# Patient Record
Sex: FEMALE | Race: WHITE | Employment: UNEMPLOYED | URBAN - METROPOLITAN AREA
[De-identification: names, ages, dates, MRNs, and addresses within clinical notes are randomized per-mention and may not be internally consistent; named-entity substitution may affect disease eponyms.]

---

## 2017-03-21 ENCOUNTER — HOSPITAL ENCOUNTER (EMERGENCY)
Facility: CLINIC | Age: 22
Discharge: HOME | End: 2017-03-21
Attending: EMERGENCY MEDICINE | Admitting: EMERGENCY MEDICINE
Payer: COMMERCIAL

## 2017-03-21 VITALS
OXYGEN SATURATION: 98 % | TEMPERATURE: 98.1 F | SYSTOLIC BLOOD PRESSURE: 132 MMHG | DIASTOLIC BLOOD PRESSURE: 75 MMHG | RESPIRATION RATE: 16 BRPM

## 2017-03-21 DIAGNOSIS — N39.0 URINARY TRACT INFECTION WITHOUT HEMATURIA, SITE UNSPECIFIED: ICD-10-CM

## 2017-03-21 LAB
ALBUMIN UR-MCNC: 30 MG/DL
APPEARANCE UR: ABNORMAL
BACTERIA #/AREA URNS HPF: ABNORMAL /HPF
BILIRUB UR QL STRIP: NEGATIVE
COLOR UR AUTO: YELLOW
GLUCOSE UR STRIP-MCNC: NEGATIVE MG/DL
HCG UR QL: NEGATIVE
HGB UR QL STRIP: NEGATIVE
KETONES UR STRIP-MCNC: NEGATIVE MG/DL
LEUKOCYTE ESTERASE UR QL STRIP: ABNORMAL
MUCOUS THREADS #/AREA URNS LPF: PRESENT /LPF
NITRATE UR QL: NEGATIVE
PH UR STRIP: 7 PH (ref 5–7)
RBC #/AREA URNS AUTO: 0 /HPF (ref 0–2)
SP GR UR STRIP: 1.02 (ref 1–1.03)
SQUAMOUS #/AREA URNS AUTO: 40 /HPF (ref 0–1)
URN SPEC COLLECT METH UR: ABNORMAL
UROBILINOGEN UR STRIP-MCNC: NORMAL MG/DL (ref 0–2)
WBC #/AREA URNS AUTO: >182 /HPF (ref 0–2)

## 2017-03-21 PROCEDURE — 99283 EMERGENCY DEPT VISIT LOW MDM: CPT

## 2017-03-21 PROCEDURE — 81025 URINE PREGNANCY TEST: CPT | Performed by: EMERGENCY MEDICINE

## 2017-03-21 PROCEDURE — 87086 URINE CULTURE/COLONY COUNT: CPT | Performed by: EMERGENCY MEDICINE

## 2017-03-21 PROCEDURE — 81001 URINALYSIS AUTO W/SCOPE: CPT | Performed by: EMERGENCY MEDICINE

## 2017-03-21 RX ORDER — NITROFURANTOIN 25; 75 MG/1; MG/1
100 CAPSULE ORAL 2 TIMES DAILY
Qty: 14 CAPSULE | Refills: 0 | Status: SHIPPED | OUTPATIENT
Start: 2017-03-21

## 2017-03-21 ASSESSMENT — ENCOUNTER SYMPTOMS
HEMATURIA: 0
DYSURIA: 1
FREQUENCY: 1
FEVER: 0

## 2017-03-21 NOTE — ED AVS SNAPSHOT
Emergency Department    [address] [place] [address]    Phone:  [phone]    Fax:  [phone]                                       Isabela Gaines   MRN: [id number]    Department:   Emergency Department   Date of Visit:  3/21/2017           After Visit Summary Signature Page     I have received my discharge instructions, and my questions have been answered. I have discussed any challenges I see with this plan with the nurse or doctor.    ..........................................................................................................................................  Patient/Patient Representative Signature      ..........................................................................................................................................  Patient Representative Print Name and Relationship to Patient    ..................................................               ................................................  Date                                            Time    ..........................................................................................................................................  Reviewed by Signature/Title    ...................................................              ..............................................  Date                                                            Time

## 2017-03-21 NOTE — DISCHARGE INSTRUCTIONS
Urinary Tract Infections in Women  Urinary tract infections (UTIs) are most often caused by bacteria (germs). These bacteria enter the urinary tract. The bacteria may come from outside the body. Or they may travel from the skin outside the rectum or vagina into the urethra. Female anatomy makes it easier for bacteria from the bowel to enter a woman s urinary tract, which is the most common source of UTI. This means women develop UTIs more often than men. Pain in or around the urinary tract is a common UTI symptom. But the only way to know for sure if you have a UTI for the health care provider to test your urine. The two tests that may be done are the urinalysis and urine culture.  Types of UTIs    Cystitis: A bladder infection (cystitis) is the most common UTI in women. You may have urgent or frequent urination. You may also have pain, burning when you urinate, and bloody urine.    Urethritis: This is an inflamed urethra, which is the tube that carries urine from the bladder to outside the body. You may have lower stomach or back pain. You may also have urgent or frequent urination.    Pyelonephritis: This is a kidney infection. If not treated, it can be serious and damage your kidneys. In severe cases, you may be hospitalized. You may have a fever and lower back pain.  Medications to treat a UTI  Most UTIs are treated with antibiotics. These kill the bacteria. The length of time you need to take them depends on the type of infection. It may be as short as 3 days. If you have repeated UTIs, a low-dose antibiotic may be needed for several months. Take antibiotics exactly as directed. Don t stop taking them until all of the medication is gone. If you stop taking the antibiotic too soon, the infection may not go away, and you may develop a resistance to the antibiotic. This can make it much harder to treat.  Lifestyle changes to treat and prevent UTIs  The lifestyle changes below will help get rid of your UTI. They  may also help prevent future UTIs.    Drink plenty of fluids. This includes water, juice, or other caffeine-free drinks. Fluids help flush bacteria out of your body.    Empty your bladder. Always empty your bladder when you feel the urge to urinate. And always urinate before going to sleep. Urine that stays in your bladder can lead to infection. Try to urinate before and after sex as well.    Practice good personal hygiene. Wipe yourself from front to back after using the toilet. This helps keep bacteria from getting into the urethra.    Use condoms during sex. These help prevent UTIs caused by sexually transmitted bacteria. Also, avoid using spermicides during sex. These can increase the risk of UTIs. Choose other forms of birth control instead. For women who tend to get UTIs after sex, a low-dose of a preventive antibiotic may be used. Be sure to discuss this option with your health care provider.    Follow up with your health care provider as directed. He or she may test to make sure the infection has cleared. If necessary, additional treatment may be started.    9443-1232 [employer]. [address] [place] [address], [place] [address]. All rights reserved. This information is not intended as a substitute for professional medical care. Always follow your healthcare professional's instructions.

## 2017-03-21 NOTE — ED AVS SNAPSHOT
Emergency Department    [address] [facility] [place] [address]    Phone:  [phone]    Fax:  [phone]                                       Isabela Gaines   MRN: [id number]    Department:   Emergency Department   Date of Visit:  3/21/2017           Patient Information     Date Of Birth          1995        Your diagnoses for this visit were:     Urinary tract infection without hematuria, site unspecified        You were seen by Lakhwinder Moreno MD.      Follow-up Information     Follow up with Christine Vargas MD.    Specialty:  Family Practice    Contact information:    [facility]  [address] [place] [address]  [phone]          Discharge Instructions         Urinary Tract Infections in Women  Urinary tract infections (UTIs) are most often caused by bacteria (germs). These bacteria enter the urinary tract. The bacteria may come from outside the body. Or they may travel from the skin outside the rectum or vagina into the urethra. Female anatomy makes it easier for bacteria from the bowel to enter a woman s urinary tract, which is the most common source of UTI. This means women develop UTIs more often than men. Pain in or around the urinary tract is a common UTI symptom. But the only way to know for sure if you have a UTI for the health care provider to test your urine. The two tests that may be done are the urinalysis and urine culture.  Types of UTIs    Cystitis: A bladder infection (cystitis) is the most common UTI in women. You may have urgent or frequent urination. You may also have pain, burning when you urinate, and bloody urine.    Urethritis: This is an inflamed urethra, which is the tube that carries urine from the bladder to outside the body. You may have lower stomach or back pain. You may also have urgent or frequent urination.    Pyelonephritis: This is a kidney infection. If not treated, it can be serious and damage your kidneys. In  severe cases, you may be hospitalized. You may have a fever and lower back pain.  Medications to treat a UTI  Most UTIs are treated with antibiotics. These kill the bacteria. The length of time you need to take them depends on the type of infection. It may be as short as 3 days. If you have repeated UTIs, a low-dose antibiotic may be needed for several months. Take antibiotics exactly as directed. Don t stop taking them until all of the medication is gone. If you stop taking the antibiotic too soon, the infection may not go away, and you may develop a resistance to the antibiotic. This can make it much harder to treat.  Lifestyle changes to treat and prevent UTIs  The lifestyle changes below will help get rid of your UTI. They may also help prevent future UTIs.    Drink plenty of fluids. This includes water, juice, or other caffeine-free drinks. Fluids help flush bacteria out of your body.    Empty your bladder. Always empty your bladder when you feel the urge to urinate. And always urinate before going to sleep. Urine that stays in your bladder can lead to infection. Try to urinate before and after sex as well.    Practice good personal hygiene. Wipe yourself from front to back after using the toilet. This helps keep bacteria from getting into the urethra.    Use condoms during sex. These help prevent UTIs caused by sexually transmitted bacteria. Also, avoid using spermicides during sex. These can increase the risk of UTIs. Choose other forms of birth control instead. For women who tend to get UTIs after sex, a low-dose of a preventive antibiotic may be used. Be sure to discuss this option with your health care provider.    Follow up with your health care provider as directed. He or she may test to make sure the infection has cleared. If necessary, additional treatment may be started.    6891-8666 [employer]. [address] [place] [address], [place] [address]. All rights reserved. This information is not  intended as a substitute for professional medical care. Always follow your healthcare professional's instructions.          Future Appointments        Provider Department Dept Phone Center    3/28/2017 7:30 AM INDIGO [facility] [phone] [facility]      24 Hour Appointment Hotline       To make an appointment at any [facility], call [phone] ([phone]). If you don't have a family doctor or clinic, we will help you find one. [facility] (formerly [facility]) are conveniently located to serve the needs of you and your family.             Review of your medicines      START taking        Dose / Directions Last dose taken    nitrofurantoin (macrocrystal-monohydrate) 100 MG capsule   Commonly known as:  MACROBID   Dose:  100 mg   Quantity:  14 capsule        Take 1 capsule (100 mg) by mouth 2 times daily   Refills:  0                Prescriptions were sent or printed at these locations (1 Prescription)                   Other Prescriptions                Printed at Department/Unit printer (1 of 1)         nitrofurantoin, macrocrystal-monohydrate, (MACROBID) 100 MG capsule                Procedures and tests performed during your visit     HCG qualitative urine    UA reflex to Microscopic and Culture    Urine Culture Aerobic Bacterial      Orders Needing Specimen Collection     None      Pending Results     Date and Time Order Name Status Description    3/21/2017 1335 Urine Culture Aerobic Bacterial In process             Pending Culture Results     Date and Time Order Name Status Description    3/21/2017 1335 Urine Culture Aerobic Bacterial In process              Test Results from your hospital stay     3/21/2017  1:54 PM - Interface, [employer] Results      Component Results     Component Value Ref Range & Units Status    Color Urine Yellow  Final    Appearance Urine Slightly Cloudy  Final    Glucose Urine Negative NEG mg/dL Final    Bilirubin Urine Negative NEG Final    Ketones  Urine Negative NEG mg/dL Final    Specific Gravity Urine 1.021 1.003 - 1.035 Final    Blood Urine Negative NEG Final    pH Urine 7.0 5.0 - 7.0 pH Final    Protein Albumin Urine 30 (A) NEG mg/dL Final    Urobilinogen mg/dL Normal 0.0 - 2.0 mg/dL Final    Nitrite Urine Negative NEG Final    Leukocyte Esterase Urine Large (A) NEG Final    Source Midstream Urine  Final    RBC Urine 0 0 - 2 /HPF Final    WBC Urine >182 (H) 0 - 2 /HPF Final    Bacteria Urine Many (A) NEG /HPF Final    Squamous Epithelial /HPF Urine 40 (H) 0 - 1 /HPF Final    Mucous Urine Present (A) NEG /LPF Final         3/21/2017  1:49 PM - Interface, [employer] Results      Component Results     Component Value Ref Range & Units Status    HCG Qual Urine Negative NEG Final         3/21/2017  1:54 PM - Interface, [employer] Results                Clinical Quality Measure: Blood Pressure Screening     Your blood pressure was checked while you were in the emergency department today. The last reading we obtained was  BP: 132/75 . Please read the guidelines below about what these numbers mean and what you should do about them.  If your systolic blood pressure (the top number) is less than 120 and your diastolic blood pressure (the bottom number) is less than 80, then your blood pressure is normal. There is nothing more that you need to do about it.  If your systolic blood pressure (the top number) is 120-139 or your diastolic blood pressure (the bottom number) is 80-89, your blood pressure may be higher than it should be. You should have your blood pressure rechecked within a year by a primary care provider.  If your systolic blood pressure (the top number) is 140 or greater or your diastolic blood pressure (the bottom number) is 90 or greater, you may have high blood pressure. High blood pressure is treatable, but if left untreated over time it can put you at risk for heart attack, stroke, or kidney failure. You should have your blood pressure rechecked by a  "primary care provider within the next 4 weeks.  If your provider in the emergency department today gave you specific instructions to follow-up with your doctor or provider even sooner than that, you should follow that instruction and not wait for up to 4 weeks for your follow-up visit.        Thank you for choosing [facility]       Thank you for choosing [facility] for your care. Our goal is always to provide you with excellent care. Hearing back from our patients is one way we can continue to improve our services. Please take a few minutes to complete the written survey that you may receive in the mail after you visit with us. Thank you!        [employer] Information     [employer] lets you send messages to your doctor, view your test results, renew your prescriptions, schedule appointments and more. To sign up, go to [email or web address] . Click on \"Log in\" on the left side of the screen, which will take you to the Welcome page. Then click on \"Sign up Now\" on the right side of the page.     You will be asked to enter the access code listed below, as well as some personal information. Please follow the directions to create your username and password.     Your access code is: [email or web address]  Expires: 2017  1:43 PM     Your access code will  in 90 days. If you need help or a new code, please call your [facility] clinic or [phone].        Care EveryWhere ID     This is your Care EveryWhere ID. This could be used by other organizations to access your [facility] medical records  [id number]        After Visit Summary       This is your record. Keep this with you and show to your community pharmacist(s) and doctor(s) at your next visit.                  "

## 2017-03-21 NOTE — ED PROVIDER NOTES
History     Chief Complaint:  Urinary symptoms     HPI   Isabela Gaines is a 21 year old female who presents for evaluation of urinary symptoms. The patient reports 1.5 weeks of frequency and dysuria. She also notes face flushing yesterday and today. She has been taking OTC medications which only helps transiently. She denies any hematuria. Patient's last LMP was 3/1. She denies any vaginal discharge or any other symptoms.     Allergies:  Vancomycin     Medications:    The patient is currently on no regular medications.     Past Medical History:    Drug induced delirium   Chronic mental illness     Past Surgical History:    Tooth extraction     Family History:    Father - diabetes, heart disease   Grandfather - diabetes     Social History:  Marital Status:  Single   Smoking status: Never smoker  Alcohol use: No   Presents to the ED with significant other     Review of Systems   Constitutional: Negative for fever.   Genitourinary: Positive for dysuria and frequency. Negative for hematuria and vaginal discharge.   All other systems reviewed and are negative.      Physical Exam     Patient Vitals for the past 24 hrs:   BP Temp Temp src Heart Rate Resp SpO2   03/21/17 1247 132/75 98.1  F (36.7  C) Oral 84 16 98 %      Physical Exam  Constitutional: The patient is oriented to person, place, and time.   HENT:   Head: Atraumatic  Right Ear: Normal  Left Ear: Normal  Nose: Nose normal.   Mouth/Throat: Oropharynx is clear and moist. No erythema or exudate.   Eyes: Conjunctivae and EOM are normal. Pupils are equal, round, and reactive to light. No discharge  Neck: Normal range of motion. Neck supple.   Cardiovascular: Normal rate, regular rhythm, no murmur gallops or rubs. Intact distal pulses.    Pulmonary/Chest: CTA bilaterally. No wheezes rale or rhonchi.  Abdominal: Soft. Mild suprapubic tenderness.  No masses   Musculoskeletal: No edema. No bony deformity. Normal range of motion  Lymphadenopathy:     The patient has  no cervical adenopathy.   Neurological: The patient is alert and oriented to person, place, and time. The patient has normal strength and normal reflexes. No cranial nerve deficit. Coordination normal.   Skin: Skin is warm and dry. No rash noted. The patient is not diaphoretic.   Psychiatric: The patient has a normal mood and affect.    Emergency Department Course     Laboratory:  UA: Slightly cloudy yellow urine, protein albumin 30 (A), large leukocyte esterase, WBC >182 (H), many  bacteria, 40 squamous cells, mucous present, otherwise WNL   Urine culture: pending   HCG: negative    Emergency Department Course:  Nursing notes and vitals reviewed.  (1304) I performed an exam of the patient as documented above.    Urine sample was obtained and sent for laboratory analysis, findings above.     (1400) Findings and plan explained to the patient. Patient discharged home with instructions regarding supportive care, medications, and reasons to return. The importance of close follow-up was reviewed. The patient was prescribed Macrobid.      Impression & Plan      Medical Decision Making:  Isabela Gaines is a 21 year old female who presents for evaluation of urinary symptoms.  This clinically is consistent with a urinary tract infection.  Urinalysis confirms the infection.  There has been no fever, back/flank pain or significant abdominal pain.  There is no clinical evidence of pyelonephritis, appendicitis, colitis, diverticulitis or any intraabdominal catastrophe. The patient will be started on antibiotics for the infection. Return if increasing pain, vomiting, fever, or inability to tolerate the oral antibiotic.  Follow up with primary physician is indicated if not improving in 2-3 days.        Diagnosis:    ICD-10-CM   1. Urinary tract infection without hematuria, site unspecified N39.0       Disposition:  Patient is discharged to home.     Discharge Medications:  New Prescriptions    NITROFURANTOIN,  MACROCRYSTAL-MONOHYDRATE, (MACROBID) 100 MG CAPSULE    Take 1 capsule (100 mg) by mouth 2 times daily         Contreras Caldera  3/21/2017    EMERGENCY DEPARTMENT    I, Contreras Caldera, am serving as a scribe on 3/21/2017 at 1:04 PM to personally document services performed by Dr. Moreno based on my observations and the provider's statements to me.        Lakhwinder Moreno MD  03/21/17 5640

## 2017-03-22 LAB
BACTERIA SPEC CULT: NORMAL
MICRO REPORT STATUS: NORMAL
SPECIMEN SOURCE: NORMAL

## 2020-06-16 ENCOUNTER — APPOINTMENT (OUTPATIENT)
Dept: RADIOLOGY | Facility: MEDICAL CENTER | Age: 25
End: 2020-06-16
Attending: EMERGENCY MEDICINE
Payer: MEDICAID

## 2020-06-16 ENCOUNTER — HOSPITAL ENCOUNTER (EMERGENCY)
Facility: MEDICAL CENTER | Age: 25
End: 2020-06-17
Attending: EMERGENCY MEDICINE
Payer: MEDICAID

## 2020-06-16 VITALS
RESPIRATION RATE: 18 BRPM | HEART RATE: 89 BPM | DIASTOLIC BLOOD PRESSURE: 58 MMHG | BODY MASS INDEX: 22.98 KG/M2 | SYSTOLIC BLOOD PRESSURE: 121 MMHG | HEIGHT: 59 IN | TEMPERATURE: 97.9 F | OXYGEN SATURATION: 98 % | WEIGHT: 114 LBS

## 2020-06-16 DIAGNOSIS — O20.0 THREATENED MISCARRIAGE IN EARLY PREGNANCY: ICD-10-CM

## 2020-06-16 LAB
ALBUMIN SERPL BCP-MCNC: 4.6 G/DL (ref 3.2–4.9)
ALBUMIN/GLOB SERPL: 1.5 G/DL
ALP SERPL-CCNC: 48 U/L (ref 30–99)
ALT SERPL-CCNC: 7 U/L (ref 2–50)
ANION GAP SERPL CALC-SCNC: 19 MMOL/L (ref 7–16)
AST SERPL-CCNC: 13 U/L (ref 12–45)
B-HCG SERPL-ACNC: ABNORMAL MIU/ML (ref 0–5)
BASOPHILS # BLD AUTO: 0.3 % (ref 0–1.8)
BASOPHILS # BLD: 0.02 K/UL (ref 0–0.12)
BILIRUB SERPL-MCNC: 0.4 MG/DL (ref 0.1–1.5)
BUN SERPL-MCNC: 6 MG/DL (ref 8–22)
CALCIUM SERPL-MCNC: 9.3 MG/DL (ref 8.5–10.5)
CHLORIDE SERPL-SCNC: 99 MMOL/L (ref 96–112)
CO2 SERPL-SCNC: 17 MMOL/L (ref 20–33)
CREAT SERPL-MCNC: 0.56 MG/DL (ref 0.5–1.4)
EOSINOPHIL # BLD AUTO: 0.04 K/UL (ref 0–0.51)
EOSINOPHIL NFR BLD: 0.5 % (ref 0–6.9)
ERYTHROCYTE [DISTWIDTH] IN BLOOD BY AUTOMATED COUNT: 38.8 FL (ref 35.9–50)
GLOBULIN SER CALC-MCNC: 3.1 G/DL (ref 1.9–3.5)
GLUCOSE SERPL-MCNC: 97 MG/DL (ref 65–99)
HCT VFR BLD AUTO: 43.1 % (ref 37–47)
HGB BLD-MCNC: 15.3 G/DL (ref 12–16)
IMM GRANULOCYTES # BLD AUTO: 0.03 K/UL (ref 0–0.11)
IMM GRANULOCYTES NFR BLD AUTO: 0.4 % (ref 0–0.9)
LYMPHOCYTES # BLD AUTO: 1.23 K/UL (ref 1–4.8)
LYMPHOCYTES NFR BLD: 16.3 % (ref 22–41)
MCH RBC QN AUTO: 29.7 PG (ref 27–33)
MCHC RBC AUTO-ENTMCNC: 35.5 G/DL (ref 33.6–35)
MCV RBC AUTO: 83.7 FL (ref 81.4–97.8)
MONOCYTES # BLD AUTO: 0.75 K/UL (ref 0–0.85)
MONOCYTES NFR BLD AUTO: 9.9 % (ref 0–13.4)
NEUTROPHILS # BLD AUTO: 5.47 K/UL (ref 2–7.15)
NEUTROPHILS NFR BLD: 72.6 % (ref 44–72)
NRBC # BLD AUTO: 0 K/UL
NRBC BLD-RTO: 0 /100 WBC
NUMBER OF RH DOSES IND 8505RD: 1
PLATELET # BLD AUTO: 409 K/UL (ref 164–446)
PMV BLD AUTO: 9.9 FL (ref 9–12.9)
POTASSIUM SERPL-SCNC: 3.2 MMOL/L (ref 3.6–5.5)
PROT SERPL-MCNC: 7.7 G/DL (ref 6–8.2)
RBC # BLD AUTO: 5.15 M/UL (ref 4.2–5.4)
RH BLD: NORMAL
SODIUM SERPL-SCNC: 135 MMOL/L (ref 135–145)
WBC # BLD AUTO: 7.5 K/UL (ref 4.8–10.8)

## 2020-06-16 PROCEDURE — 81001 URINALYSIS AUTO W/SCOPE: CPT

## 2020-06-16 PROCEDURE — 84702 CHORIONIC GONADOTROPIN TEST: CPT

## 2020-06-16 PROCEDURE — 99284 EMERGENCY DEPT VISIT MOD MDM: CPT

## 2020-06-16 PROCEDURE — 86901 BLOOD TYPING SEROLOGIC RH(D): CPT

## 2020-06-16 PROCEDURE — 80053 COMPREHEN METABOLIC PANEL: CPT

## 2020-06-16 PROCEDURE — 76801 OB US < 14 WKS SINGLE FETUS: CPT

## 2020-06-16 PROCEDURE — 85025 COMPLETE CBC W/AUTO DIFF WBC: CPT

## 2020-06-16 PROCEDURE — 700105 HCHG RX REV CODE 258: Performed by: EMERGENCY MEDICINE

## 2020-06-16 RX ORDER — SODIUM CHLORIDE 9 MG/ML
1000 INJECTION, SOLUTION INTRAVENOUS ONCE
Status: COMPLETED | OUTPATIENT
Start: 2020-06-16 | End: 2020-06-16

## 2020-06-16 RX ADMIN — SODIUM CHLORIDE 1000 ML: 9 INJECTION, SOLUTION INTRAVENOUS at 21:55

## 2020-06-17 LAB
APPEARANCE UR: CLEAR
BACTERIA #/AREA URNS HPF: ABNORMAL /HPF
BILIRUB UR QL STRIP.AUTO: NEGATIVE
COLOR UR: ABNORMAL
EPI CELLS #/AREA URNS HPF: ABNORMAL /HPF
GLUCOSE UR STRIP.AUTO-MCNC: NEGATIVE MG/DL
HYALINE CASTS #/AREA URNS LPF: ABNORMAL /LPF
KETONES UR STRIP.AUTO-MCNC: >=80 MG/DL
LEUKOCYTE ESTERASE UR QL STRIP.AUTO: ABNORMAL
MICRO URNS: ABNORMAL
NITRITE UR QL STRIP.AUTO: POSITIVE
PH UR STRIP.AUTO: 6 [PH] (ref 5–8)
PROT UR QL STRIP: NEGATIVE MG/DL
RBC # URNS HPF: ABNORMAL /HPF
RBC UR QL AUTO: ABNORMAL
SP GR UR STRIP.AUTO: 1.01
UROBILINOGEN UR STRIP.AUTO-MCNC: 1 MG/DL
WBC #/AREA URNS HPF: ABNORMAL /HPF

## 2020-06-17 NOTE — ED NOTES
Discharge instructions reviewed and signed with patient. PIV removed by RN. No questions at this time. Patient ambulated out of ED with a steady gait.

## 2020-06-17 NOTE — ED TRIAGE NOTES
"Chief Complaint   Patient presents with   • Vaginal Bleeding   • Low Back Pain   • Pregnancy     6-7 weeks pregnant      Pt wheeled to triage for above complaint. Pt went to [facility] on Sunday for abd cramping and was told everything was fine and received Rhogam shot. Pt now reports she went to the bathroom and felt \"gushing blood\". Pt came straight here after the event occurred. Pt has abdominal cramping, low back pain, nausea and feels \"very dizzy\". Pt has pads in place but is unsure if she is still bleeding at the moment.   Charge RN notified. Pt to G34  /93   Pulse (!) 109   Temp 36.6 °C (97.9 °F) (Temporal)   Resp 18   Ht 1.499 m (4' 11\")   Wt 51.7 kg (114 lb)   SpO2 97%   BMI 23.03 kg/m²     "

## 2020-06-17 NOTE — ED PROVIDER NOTES
ED Provider Note    CHIEF COMPLAINT  Chief Complaint   Patient presents with   • Vaginal Bleeding   • Low Back Pain   • Pregnancy     6-7 weeks pregnant        HPI  Kim Brice is a 25 y.o. female who presents with vaginal bleeding.  The patient states she is about 6-1/2 weeks pregnant.  She states over the last several week she has had a lot of cramping abdominal pain.  She was recently evaluated at [facility] had an ultrasound that she states was normal.  The patient today started having increased cramping followed by some vomiting and diarrhea.  That she started having heavy vaginal bleeding and does have some associated dizziness.  She denies difficulties with urination.  She has not had any recent fevers.  She is unaware of any sick contacts.    REVIEW OF SYSTEMS  See HPI for further details. All other systems are negative.     PAST MEDICAL HISTORY  No past medical history on file.    FAMILY HISTORY  [unfilled]    SOCIAL HISTORY  Social History     Socioeconomic History   • Marital status: Not on file     Spouse name: Not on file   • Number of children: Not on file   • Years of education: Not on file   • Highest education level: Not on file   Occupational History   • Not on file   Social Needs   • Financial resource strain: Not on file   • Food insecurity     Worry: Not on file     Inability: Not on file   • Transportation needs     Medical: Not on file     Non-medical: Not on file   Tobacco Use   • Smoking status: Former Smoker   • Smokeless tobacco: Never Used   Substance and Sexual Activity   • Alcohol use: Not Currently   • Drug use: Not Currently   • Sexual activity: Not on file   Lifestyle   • Physical activity     Days per week: Not on file     Minutes per session: Not on file   • Stress: Not on file   Relationships   • Social connections     Talks on phone: Not on file     Gets together: Not on file     Attends [employer] service: Not on file     Active member of club or  "organization: Not on file     Attends meetings of clubs or organizations: Not on file     Relationship status: Not on file   • Intimate partner violence     Fear of current or ex partner: Not on file     Emotionally abused: Not on file     Physically abused: Not on file     Forced sexual activity: Not on file   Other Topics Concern   • Not on file   Social History Narrative   • Not on file       SURGICAL HISTORY  No past surgical history on file.    CURRENT MEDICATIONS  Home Medications    **Home medications have not yet been reviewed for this encounter**         ALLERGIES  Allergies   Allergen Reactions   • Vancomycin        PHYSICAL EXAM  VITAL SIGNS: /93   Pulse (!) 109   Temp 36.6 °C (97.9 °F) (Temporal)   Resp 18   Ht 1.499 m (4' 11\")   Wt 51.7 kg (114 lb)   SpO2 97%   BMI 23.03 kg/m²       Constitutional: Mild acute distress, Non-toxic appearance.   HENT: Normocephalic, Atraumatic, Bilateral external ears normal, Oropharynx moist, No oral exudates, Nose normal.   Eyes: PERRLA, EOMI, Conjunctiva normal, No discharge.   Neck: Normal range of motion, No tenderness, Supple, No stridor.   Lymphatic: No lymphadenopathy noted.   Cardiovascular: Tachycardic heart rate, Normal rhythm, No murmurs, No rubs, No gallops.   Thorax & Lungs: Normal breath sounds, No respiratory distress, No wheezing, No chest tenderness.   Abdomen: Bowel sounds normal, Soft, suprapubic tenderness, No masses, No pulsatile masses.   Skin: Warm, Dry, No erythema, No rash.   Back: No tenderness, No CVA tenderness.   Genitalia: Cervix is closed, no uterine or adnexal tenderness, mild amount of blood in the vault  Extremities: Intact distal pulses, No edema, No tenderness, No cyanosis, No clubbing.   Neurologic: Alert & oriented x 3, Normal motor function, Normal sensory function, No focal deficits noted.   Psychiatric: Affect normal, Judgment normal, Mood normal.     RADIOLOGY/PROCEDURES  US-OB 1ST TRIMESTER WITH TRANSVAGINAL (COMBO) "   Final Result         1.  Viable single intrauterine gestation of an estimated gestational age 6 weeks 3 days for estimated date of delivery February 2, 2021.   2.  Small subchorionic hemorrhage.   3.  Probable right ovarian corpus luteal cyst, otherwise indeterminate.            COURSE & MEDICAL DECISION MAKING  Pertinent Labs & Imaging studies reviewed. (See chart for details)  This a 25-year-old female who presents the emergency department with vaginal bleeding.  Ultrasound confirms intrauterine implantation with a subchorionic hemorrhage.  I suspect this is the source.  The patient is currently hemodynamically stable.  Her cervix is closed which is comforting.  The patient will be discharged with instructions to drink lots of fluids and maintain pelvic rest until cleared by her gynecologist.  She is not anemic.  She is Rh- and received RhoGam prior to discharge.    FINAL IMPRESSION  1.  Threatened miscarriage    Disposition  The patient will be discharged in stable condition         Electronically signed by: Alfredo Carlson M.D., 6/16/2020 9:48 PM

## 2020-12-31 ENCOUNTER — HOSPITAL ENCOUNTER (INPATIENT)
Dept: HOSPITAL 8 - 3E | Age: 25
LOS: 4 days | Discharge: HOME | DRG: 751 | End: 2021-01-04
Attending: PSYCHIATRY & NEUROLOGY | Admitting: PSYCHIATRY & NEUROLOGY
Payer: MEDICAID

## 2020-12-31 VITALS — HEIGHT: 59 IN | WEIGHT: 116.18 LBS | BODY MASS INDEX: 23.42 KG/M2

## 2020-12-31 DIAGNOSIS — G47.00: ICD-10-CM

## 2020-12-31 DIAGNOSIS — F12.10: ICD-10-CM

## 2020-12-31 DIAGNOSIS — Z79.899: ICD-10-CM

## 2020-12-31 DIAGNOSIS — F10.20: ICD-10-CM

## 2020-12-31 DIAGNOSIS — F33.2: Primary | ICD-10-CM

## 2020-12-31 PROCEDURE — 84443 ASSAY THYROID STIM HORMONE: CPT

## 2020-12-31 PROCEDURE — 82607 VITAMIN B-12: CPT

## 2020-12-31 PROCEDURE — 84439 ASSAY OF FREE THYROXINE: CPT

## 2020-12-31 PROCEDURE — 80053 COMPREHEN METABOLIC PANEL: CPT

## 2020-12-31 PROCEDURE — 93005 ELECTROCARDIOGRAM TRACING: CPT

## 2020-12-31 PROCEDURE — 85025 COMPLETE CBC W/AUTO DIFF WBC: CPT

## 2020-12-31 PROCEDURE — 36415 COLL VENOUS BLD VENIPUNCTURE: CPT

## 2020-12-31 PROCEDURE — 71045 X-RAY EXAM CHEST 1 VIEW: CPT

## 2020-12-31 PROCEDURE — 81001 URINALYSIS AUTO W/SCOPE: CPT

## 2020-12-31 PROCEDURE — 87086 URINE CULTURE/COLONY COUNT: CPT

## 2020-12-31 PROCEDURE — 81025 URINE PREGNANCY TEST: CPT

## 2020-12-31 PROCEDURE — 80061 LIPID PANEL: CPT

## 2020-12-31 RX ADMIN — QUETIAPINE FUMARATE SCH MG: 25 TABLET ORAL at 22:47

## 2021-01-01 VITALS — DIASTOLIC BLOOD PRESSURE: 62 MMHG | SYSTOLIC BLOOD PRESSURE: 95 MMHG

## 2021-01-01 VITALS — DIASTOLIC BLOOD PRESSURE: 75 MMHG | SYSTOLIC BLOOD PRESSURE: 109 MMHG

## 2021-01-01 VITALS — SYSTOLIC BLOOD PRESSURE: 109 MMHG | DIASTOLIC BLOOD PRESSURE: 73 MMHG

## 2021-01-01 LAB
ALBUMIN SERPL-MCNC: 3.9 G/DL (ref 3.4–5)
ALP SERPL-CCNC: 70 U/L (ref 45–117)
ALT SERPL-CCNC: 13 U/L (ref 12–78)
ANION GAP SERPL CALC-SCNC: 6 MMOL/L (ref 5–15)
BASOPHILS # BLD AUTO: 0 X10^3/UL (ref 0–0.1)
BASOPHILS NFR BLD AUTO: 1 % (ref 0–1)
BILIRUB SERPL-MCNC: 0.6 MG/DL (ref 0.2–1)
CALCIUM SERPL-MCNC: 8.9 MG/DL (ref 8.5–10.1)
CHLORIDE SERPL-SCNC: 112 MMOL/L (ref 98–107)
CHOL/HDL RATIO: 2.9
CREAT SERPL-MCNC: 0.88 MG/DL (ref 0.55–1.02)
EOSINOPHIL # BLD AUTO: 0.3 X10^3/UL (ref 0–0.4)
EOSINOPHIL NFR BLD AUTO: 6 % (ref 1–7)
ERYTHROCYTE [DISTWIDTH] IN BLOOD BY AUTOMATED COUNT: 14.2 % (ref 9.6–15.2)
HCG UR SG: 1.03 (ref 1–1.03)
HDL CHOL %: 35 % (ref 28–40)
HDL CHOLESTEROL (DIRECT): 57 MG/DL (ref 40–60)
LDL CHOLESTEROL,CALCULATED: 94 MG/DL (ref 54–169)
LDLC/HDLC SERPL: 1.6 {RATIO} (ref 0.5–3)
LYMPHOCYTES # BLD AUTO: 1.5 X10^3/UL (ref 1–3.4)
LYMPHOCYTES NFR BLD AUTO: 37 % (ref 22–44)
MCH RBC QN AUTO: 28.9 PG (ref 27–34.8)
MCHC RBC AUTO-ENTMCNC: 34.2 G/DL (ref 32.4–35.8)
MD: NO
MICROSCOPIC: (no result)
MONOCYTES # BLD AUTO: 0.4 X10^3/UL (ref 0.2–0.8)
MONOCYTES NFR BLD AUTO: 9 % (ref 2–9)
NEUTROPHILS # BLD AUTO: 2 X10^3/UL (ref 1.8–6.8)
NEUTROPHILS NFR BLD AUTO: 48 % (ref 42–75)
PLATELET # BLD AUTO: 381 X10^3/UL (ref 130–400)
PMV BLD AUTO: 8.7 FL (ref 7.4–10.4)
PROT SERPL-MCNC: 7.7 G/DL (ref 6.4–8.2)
RBC # BLD AUTO: 5.2 X10^6/UL (ref 3.82–5.3)
T4 FREE SERPL-MCNC: 1.01 NG/DL (ref 0.76–1.46)
TRIGL SERPL-MCNC: 64 MG/DL (ref 50–200)
VLDLC SERPL CALC-MCNC: 13 MG/DL (ref 0–25)

## 2021-01-01 RX ADMIN — QUETIAPINE FUMARATE SCH MG: 25 TABLET ORAL at 20:16

## 2021-01-01 RX ADMIN — SERTRALINE HYDROCHLORIDE SCH MG: 50 TABLET ORAL at 20:17

## 2021-01-02 VITALS — DIASTOLIC BLOOD PRESSURE: 77 MMHG | SYSTOLIC BLOOD PRESSURE: 113 MMHG

## 2021-01-02 VITALS — DIASTOLIC BLOOD PRESSURE: 67 MMHG | SYSTOLIC BLOOD PRESSURE: 100 MMHG

## 2021-01-02 RX ADMIN — QUETIAPINE FUMARATE SCH MG: 25 TABLET ORAL at 20:05

## 2021-01-02 RX ADMIN — SERTRALINE HYDROCHLORIDE SCH MG: 50 TABLET ORAL at 20:05

## 2021-01-03 VITALS — DIASTOLIC BLOOD PRESSURE: 70 MMHG | SYSTOLIC BLOOD PRESSURE: 104 MMHG

## 2021-01-03 VITALS — DIASTOLIC BLOOD PRESSURE: 70 MMHG | SYSTOLIC BLOOD PRESSURE: 103 MMHG

## 2021-01-03 RX ADMIN — QUETIAPINE FUMARATE SCH MG: 25 TABLET ORAL at 20:12

## 2021-01-04 VITALS — DIASTOLIC BLOOD PRESSURE: 68 MMHG | SYSTOLIC BLOOD PRESSURE: 98 MMHG

## 2021-03-29 ENCOUNTER — OFFICE VISIT (OUTPATIENT)
Dept: URGENT CARE | Facility: PHYSICIAN GROUP | Age: 26
End: 2021-03-29
Payer: MEDICAID

## 2021-03-29 VITALS
OXYGEN SATURATION: 97 % | WEIGHT: 125 LBS | HEIGHT: 59 IN | BODY MASS INDEX: 25.2 KG/M2 | TEMPERATURE: 98 F | SYSTOLIC BLOOD PRESSURE: 108 MMHG | HEART RATE: 76 BPM | DIASTOLIC BLOOD PRESSURE: 82 MMHG | RESPIRATION RATE: 18 BRPM

## 2021-03-29 DIAGNOSIS — J30.2 SEASONAL ALLERGIES: ICD-10-CM

## 2021-03-29 PROCEDURE — 99203 OFFICE O/P NEW LOW 30 MIN: CPT | Performed by: PHYSICIAN ASSISTANT

## 2021-03-29 RX ORDER — QUETIAPINE FUMARATE 50 MG/1
TABLET, FILM COATED ORAL
COMMUNITY
Start: 2021-02-06 | End: 2021-10-08

## 2021-03-29 RX ORDER — QUETIAPINE FUMARATE 25 MG/1
50 TABLET, FILM COATED ORAL
COMMUNITY
Start: 2021-01-04 | End: 2021-10-08

## 2021-03-29 RX ORDER — DEXAMETHASONE 4 MG/1
TABLET ORAL
COMMUNITY
Start: 2021-01-30 | End: 2021-03-29

## 2021-03-29 RX ORDER — FAMOTIDINE 20 MG/1
TABLET, FILM COATED ORAL
COMMUNITY
Start: 2021-01-30 | End: 2021-10-08

## 2021-03-29 ASSESSMENT — FIBROSIS 4 INDEX: FIB4 SCORE: 0.32

## 2021-03-29 NOTE — PROGRESS NOTES
"Chief Complaint   Patient presents with   • Cough     Persistent cough, and mucus production. Pt states OTC allergy meds provide some relief. Pt states Sx started about 1 month ago.         HISTORY OF PRESENT ILLNESS: Patient is a 25 y.o. female who presents today for the following:    ST x 1 month  + nasal congestion, watery eyes, cough  H/o allergies, both seasonal and environmental/medication  Worse when windows are open  Claritin helps  Had a reaction to something    There are no problems to display for this patient.      Allergies:Vancomycin    Current Outpatient Medications Ordered in Epic   Medication Sig Dispense Refill   • famotidine (PEPCID) 20 MG Tab TAKE 1 TABLET BY MOUTH TWICE DAILY FOR 7 DAYS     • QUEtiapine (SEROQUEL) 50 MG tablet TAKE 1 TABLET BY MOUTH EVERY NIGHT AT BEDTIME FOR MOOD STABALIZATION     • QUEtiapine (SEROQUEL) 25 MG Tab Take 50 mg by mouth.       No current Epic-ordered facility-administered medications on file.       History reviewed. No pertinent past medical history.    Social History     Tobacco Use   • Smoking status: Former Smoker   • Smokeless tobacco: Never Used   Substance Use Topics   • Alcohol use: Not Currently   • Drug use: Not Currently       No family status information on file.   History reviewed. No pertinent family history.    Review of Systems:   Constitutional ROS: No unexpected change in weight  Pulmonary ROS: No chronic cough, sputum, or hemoptysis, No dyspnea on exertion, No wheezing  Cardiovascular ROS: No diaphoresis, No edema, No palpitations  Hematologic/Lymphatic ROS: No chills, No night sweats, No weight loss  Skin/Integumentary ROS: No edema, No evidence of rash, No itching    Exam:  /82   Pulse 76   Temp 36.7 °C (98 °F) (Temporal)   Resp 18   Ht 1.499 m (4' 11\")   Wt 56.7 kg (125 lb)   SpO2 97%   General: Well developed, well nourished. No distress.    Eye: PERRL/EOMI; conjunctivae clear, lids normal.  ENMT: Lips without lesions, MMM. " Oropharynx is clear. Bilateral TMs are within normal limits but bulging bilaterally.  Nasal mucosa is markedly edematous bilaterally.  Pulmonary: Unlabored respiratory effort. Lungs clear to auscultation, no wheezes, no rhonchi.    Cardiovascular: Regular rate and rhythm without murmur.   Neurologic: Grossly nonfocal. No facial asymmetry noted.  Lymph: No cervical lymphadenopathy noted.  Skin: Warm, dry, good turgor. No rashes in visible areas.   Psych: Normal mood. Alert and oriented to person, place and time.    Assessment/Plan:  Discussed likely seasonal allergies.  Discussed appropriate over-the-counter symptomatic medication, and when to return to clinic. Follow up for worsening or persistent symptoms.  1. Seasonal allergies  REFERRAL TO ALLERGY

## 2021-10-08 ENCOUNTER — APPOINTMENT (OUTPATIENT)
Dept: RADIOLOGY | Facility: IMAGING CENTER | Age: 26
End: 2021-10-08
Attending: FAMILY MEDICINE
Payer: MEDICAID

## 2021-10-08 ENCOUNTER — HOSPITAL ENCOUNTER (OUTPATIENT)
Facility: MEDICAL CENTER | Age: 26
End: 2021-10-08
Attending: FAMILY MEDICINE
Payer: MEDICAID

## 2021-10-08 ENCOUNTER — HOSPITAL ENCOUNTER (OUTPATIENT)
Dept: LAB | Facility: MEDICAL CENTER | Age: 26
End: 2021-10-08
Attending: FAMILY MEDICINE
Payer: MEDICAID

## 2021-10-08 ENCOUNTER — HOSPITAL ENCOUNTER (EMERGENCY)
Facility: MEDICAL CENTER | Age: 26
End: 2021-10-08
Payer: MEDICAID

## 2021-10-08 ENCOUNTER — OFFICE VISIT (OUTPATIENT)
Dept: URGENT CARE | Facility: PHYSICIAN GROUP | Age: 26
End: 2021-10-08
Payer: MEDICAID

## 2021-10-08 VITALS
BODY MASS INDEX: 23.79 KG/M2 | HEIGHT: 59 IN | RESPIRATION RATE: 18 BRPM | SYSTOLIC BLOOD PRESSURE: 116 MMHG | HEART RATE: 88 BPM | WEIGHT: 118 LBS | DIASTOLIC BLOOD PRESSURE: 70 MMHG | TEMPERATURE: 98.1 F | OXYGEN SATURATION: 96 %

## 2021-10-08 VITALS
BODY MASS INDEX: 25.25 KG/M2 | HEIGHT: 59 IN | OXYGEN SATURATION: 93 % | TEMPERATURE: 97.8 F | DIASTOLIC BLOOD PRESSURE: 73 MMHG | SYSTOLIC BLOOD PRESSURE: 114 MMHG | RESPIRATION RATE: 16 BRPM | HEART RATE: 86 BPM

## 2021-10-08 DIAGNOSIS — R06.02 SOB (SHORTNESS OF BREATH): ICD-10-CM

## 2021-10-08 DIAGNOSIS — M94.0 COSTOCHONDRITIS: ICD-10-CM

## 2021-10-08 LAB
D DIMER PPP IA.FEU-MCNC: <0.27 UG/ML (FEU) (ref 0–0.5)
INT CON NEG: NORMAL
INT CON POS: NORMAL
POC URINE PREGNANCY TEST: NEGATIVE

## 2021-10-08 PROCEDURE — U0005 INFEC AGEN DETEC AMPLI PROBE: HCPCS

## 2021-10-08 PROCEDURE — 85379 FIBRIN DEGRADATION QUANT: CPT

## 2021-10-08 PROCEDURE — 36415 COLL VENOUS BLD VENIPUNCTURE: CPT

## 2021-10-08 PROCEDURE — 71046 X-RAY EXAM CHEST 2 VIEWS: CPT | Mod: TC,FY | Performed by: FAMILY MEDICINE

## 2021-10-08 PROCEDURE — 81025 URINE PREGNANCY TEST: CPT | Performed by: FAMILY MEDICINE

## 2021-10-08 PROCEDURE — U0003 INFECTIOUS AGENT DETECTION BY NUCLEIC ACID (DNA OR RNA); SEVERE ACUTE RESPIRATORY SYNDROME CORONAVIRUS 2 (SARS-COV-2) (CORONAVIRUS DISEASE [COVID-19]), AMPLIFIED PROBE TECHNIQUE, MAKING USE OF HIGH THROUGHPUT TECHNOLOGIES AS DESCRIBED BY CMS-2020-01-R: HCPCS

## 2021-10-08 PROCEDURE — 302449 STATCHG TRIAGE ONLY (STATISTIC)

## 2021-10-08 PROCEDURE — 99215 OFFICE O/P EST HI 40 MIN: CPT | Mod: CS | Performed by: FAMILY MEDICINE

## 2021-10-08 ASSESSMENT — FIBROSIS 4 INDEX: FIB4 SCORE: 0.34

## 2021-10-08 NOTE — PROGRESS NOTES
"Chief Complaint   Patient presents with   • Coronavirus Screening     SOB     Subjective:                 CC:  cough        Cough  This is a new problem. The current episode started 4  days ago. The problem has been unchanged. The problem occurs constantly. The cough is dry. Associated symptoms include : fatigue, sob, bilat ribcage pain with deep inspiration, subj fever, anosmia. Pertinent negatives include no   headaches, nausea, vomiting, diarrhea, sweats, weight loss or wheezing. Nothing aggravates the symptoms.  Patient has tried nothing for the symptoms. There is no history of asthma.   + exposure to COVID       No past medical history on file.      Social History     Tobacco Use   • Smoking status: Former Smoker   • Smokeless tobacco: Never Used   Vaping Use   • Vaping Use: Never used   Substance Use Topics   • Alcohol use: Not Currently   • Drug use: Not Currently         Current Outpatient Medications on File Prior to Visit   Medication Sig Dispense Refill   • NS SOLN 60 mL with albuterol 2.5 mg/0.5 mL NEBU 10 mL Take 10 mg/hr by nebulization.     • Montelukast Sodium (SINGULAIR PO) Take  by mouth.       No current facility-administered medications on file prior to visit.                    Review of Systems      HENT: negative for otalgia  Cardiovascular - + ribcage pain and dyspnea  Respiratory: Positive for cough.  .  Negative for wheezing.    Neurological: Negative for headaches.   GI - denies nausea, vomiting or diarrhea  Neuro - denies numbness or tingling.            Objective:     /70   Pulse 88   Temp 36.7 °C (98.1 °F)   Resp 18   Ht 1.499 m (4' 11\")   Wt 53.5 kg (118 lb)   SpO2 96%     Physical Exam   Constitutional: patient is oriented to person, place, and time. Patient appears well-developed and well-nourished. No distress.   HENT:   Head: Normocephalic and atraumatic.   Right Ear: External ear normal.   Left Ear: External ear normal.   Nose: Mucosal edema  present. Right sinus " exhibits no maxillary sinus tenderness. Left sinus exhibits no maxillary sinus tenderness.   Mouth/Throat: Mucous membranes are normal. No oral lesions.  No posterior pharyngeal erythema.  No oropharyngeal exudate or posterior oropharyngeal edema.   Eyes: Conjunctivae and EOM are normal. Pupils are equal, round, and reactive to light. Right eye exhibits no discharge. Left eye exhibits no discharge. No scleral icterus.   Neck: Normal range of motion. Neck supple. No tracheal deviation present.   Cardiovascular: Normal rate, regular rhythm and normal heart sounds.  Exam reveals no friction rub.    Pulmonary/Chest: Effort normal. No respiratory distress. Patient has no wheezes or rhonchi. Patient has no rales.    Musculoskeletal:  exhibits no edema.   Lymphadenopathy:     Patient has no cervical adenopathy.      Neurological: patient is alert and oriented to person, place, and time.   Skin: Skin is warm and dry. No rash noted. No erythema.   Psychiatric: patient  has a normal mood and affect.  behavior is normal.   Nursing note and vitals reviewed.         Details    Reading Physician Reading Date Result Priority   Kira Brice M.D.  [phone] 10/8/2021 Urgent [facility]   Narrative & Impression     10/8/2021 2:11 PM     HISTORY/REASON FOR EXAM:  Cough        TECHNIQUE/EXAM DESCRIPTION AND NUMBER OF VIEWS:  PA and lateral views of the chest.     COMPARISON: None     FINDINGS:     Heart: The cardiac silhouette is normal in size.     Mediastinum: Normal contours.     Lungs: The lungs are clear.     Pleura: No pleural fluid.     Bones: No suspicious bony lesions.     Lines/tubes: None.           IMPRESSION:     No evidence of acute cardiopulmonary process.        Exam Ended: 10/08/21  2:23 PM Last Resulted: 10/08/21  2:29 PM             Assessment/Plan:       1. SOB (shortness of breath)   Chest x-ray was personally interpreted and reviewed. No acute cardiopulmonary findings. No pulmonary infiltrates or densities.  Cardiac silhouette is normal. No hemidiaphragm elevation. No bony abnormalities.    Sx likely represent COVID      bilat rib pain likely secondary to cough.    rx voltaren gel, prn      D-dimer ordered to r/o PE        COVID screen sent  Home isolation for now  Will call with results.     Follow up in one week if no improvement, sooner if symptoms worsen.         My total time spent caring for the patient on the day of the encounter was 40 minutes.   This does not include time spent on separately billable procedures/tests.

## 2021-10-09 DIAGNOSIS — R06.02 SOB (SHORTNESS OF BREATH): ICD-10-CM

## 2021-10-09 LAB
COVID ORDER STATUS COVID19: NORMAL
SARS-COV-2 RNA RESP QL NAA+PROBE: DETECTED
SPECIMEN SOURCE: ABNORMAL

## 2021-11-06 ENCOUNTER — APPOINTMENT (OUTPATIENT)
Dept: RADIOLOGY | Facility: IMAGING CENTER | Age: 26
End: 2021-11-06
Attending: FAMILY MEDICINE
Payer: MEDICAID

## 2021-11-06 ENCOUNTER — OFFICE VISIT (OUTPATIENT)
Dept: URGENT CARE | Facility: PHYSICIAN GROUP | Age: 26
End: 2021-11-06
Payer: MEDICAID

## 2021-11-06 VITALS
TEMPERATURE: 98 F | WEIGHT: 121 LBS | SYSTOLIC BLOOD PRESSURE: 110 MMHG | HEART RATE: 74 BPM | RESPIRATION RATE: 16 BRPM | DIASTOLIC BLOOD PRESSURE: 76 MMHG | BODY MASS INDEX: 24.39 KG/M2 | OXYGEN SATURATION: 100 % | HEIGHT: 59 IN

## 2021-11-06 DIAGNOSIS — M54.50 ACUTE LOW BACK PAIN WITHOUT SCIATICA, UNSPECIFIED BACK PAIN LATERALITY: ICD-10-CM

## 2021-11-06 DIAGNOSIS — S33.5XXA LUMBAR SPRAIN, INITIAL ENCOUNTER: ICD-10-CM

## 2021-11-06 PROCEDURE — 99214 OFFICE O/P EST MOD 30 MIN: CPT | Performed by: FAMILY MEDICINE

## 2021-11-06 PROCEDURE — 72100 X-RAY EXAM L-S SPINE 2/3 VWS: CPT | Mod: TC,FY | Performed by: FAMILY MEDICINE

## 2021-11-06 PROCEDURE — 72220 X-RAY EXAM SACRUM TAILBONE: CPT | Mod: TC,FY | Performed by: FAMILY MEDICINE

## 2021-11-06 RX ORDER — HYDROCODONE BITARTRATE AND ACETAMINOPHEN 5; 325 MG/1; MG/1
TABLET ORAL
Qty: 15 TABLET | Refills: 0 | Status: CANCELLED | OUTPATIENT
Start: 2021-11-06

## 2021-11-06 RX ORDER — PREDNISONE 20 MG/1
TABLET ORAL
Qty: 9 TABLET | Refills: 0 | Status: SHIPPED | OUTPATIENT
Start: 2021-11-06 | End: 2021-11-06

## 2021-11-06 RX ORDER — CYCLOBENZAPRINE HCL 10 MG
TABLET ORAL
Qty: 21 TABLET | Refills: 0 | Status: SHIPPED | OUTPATIENT
Start: 2021-11-06 | End: 2022-02-20

## 2021-11-06 ASSESSMENT — PAIN SCALES - GENERAL: PAINLEVEL: 3=SLIGHT PAIN

## 2021-11-06 ASSESSMENT — FIBROSIS 4 INDEX: FIB4 SCORE: 0.34

## 2021-11-06 NOTE — PROGRESS NOTES
Chief Complaint:    Chief Complaint   Patient presents with   • Back Pain     lifted bed on sunday and felt pop in her back, bent over last night and felt pop again and now has severe back pain        History of Present Illness:    She picked up a bed possibly 6 days ago or less, felt pop and pain in lower back/coccyx area. It seemed to be getting better with rest, time, OTC NSAID, and marijuana. However, bent over last night and felt the pop and pain again.      Past Medical History:    No past medical history on file.    Past Surgical History:    No past surgical history on file.    Social History:    Social History     Socioeconomic History   • Marital status: Single     Spouse name: Not on file   • Number of children: Not on file   • Years of education: Not on file   • Highest education level: Not on file   Occupational History   • Not on file   Tobacco Use   • Smoking status: Former Smoker   • Smokeless tobacco: Never Used   Vaping Use   • Vaping Use: Never used   Substance and Sexual Activity   • Alcohol use: Not Currently   • Drug use: Yes     Types: Marijuana   • Sexual activity: Not on file   Other Topics Concern   • Not on file   Social History Narrative   • Not on file     Social Determinants of Health     Financial Resource Strain:    • Difficulty of Paying Living Expenses:    Food Insecurity:    • Worried About Running Out of Food in the Last Year:    • Ran Out of Food in the Last Year:    Transportation Needs:    • Lack of Transportation (Medical):    • Lack of Transportation (Non-Medical):    Physical Activity:    • Days of Exercise per Week:    • Minutes of Exercise per Session:    Stress:    • Feeling of Stress :    Social Connections:    • Frequency of Communication with Friends and Family:    • Frequency of Social Gatherings with Friends and Family:    • Attends L[employer] Services:    • Active Member of Clubs or Organizations:    • Attends Club or Organization Meetings:    • Marital Status:   "  Intimate Partner Violence:    • Fear of Current or Ex-Partner:    • Emotionally Abused:    • Physically Abused:    • Sexually Abused:      Family History:    No family history on file.    Medications:    Current Outpatient Medications on File Prior to Visit   Medication Sig Dispense Refill   • Naproxen Sodium (ALEVE PO) Take  by mouth.       No current facility-administered medications on file prior to visit.     Allergies:    Allergies   Allergen Reactions   • Vancomycin        Vitals:    Vitals:    11/06/21 1511   BP: 110/76   Pulse: 74   Resp: 16   Temp: 36.7 °C (98 °F)   SpO2: 100%   Weight: 54.9 kg (121 lb)   Height: 1.499 m (4' 11\")       Physical Exam:    Constitutional: Vital signs reviewed. Appears well-developed and well-nourished. No acute distress.   Eyes: Sclera white, conjunctivae clear.  ENT: External ears normal. Hearing normal.  Pulmonary/Chest: Respirations non-labored.  Musculoskeletal: Tender in midline lower L-spine and coccyx area with palpation and lumbar flexion and extension. Moderately decreased lumbar flexion and extension due to pain. Normal gait. No muscular atrophy or weakness.  Neurological: Alert and oriented to person, place, and time. Muscle tone normal. Coordination normal. Light touch and sensation normal.   Skin: No rashes or lesions. Warm, dry, normal turgor.  Psychiatric: Normal mood and affect. Behavior is normal. Judgment and thought content normal.       Diagnostics:    DX-LUMBAR SPINE-2 OR 3 VIEWS  Order: [id number]  Status:  Final result   Visible to patient:  No (scheduled for 11/7/2021  1:48 PM) Next appt:  None Dx:  Acute low back pain without sciatica,...   0 Result Notes  Details    Reading Physician Reading Date Result Priority   Stephania Allen M.D.  [phone] 11/6/2021 Urgent Care   Narrative & Impression     11/6/2021 3:20 PM     HISTORY/REASON FOR EXAM: Pain. Injury.     TECHNIQUE/ EXAM DESCRIPTION AND NUMBER OF VIEWS:  3 views of the lumbar " spine.     COMPARISON: None.     FINDINGS:  There are 5 nonrib-bearing lumbar vertebra. No compression fracture is identified. Intervertebral disc spaces are maintained. Alignment of the facet joints is maintained. There is a moderate amount of colonic stool. SI joints are symmetric. Femoral heads   are seated within the acetabula.     IMPRESSION:     1.  No fracture or subluxation is seen.  2.  Moderate amount of colonic stool.        DX-SACRUM AND COCCYX 2+  Order: [id number]  Status:  Final result   Visible to patient:  No (scheduled for 2021  1:49 PM) Next appt:  None Dx:  Acute low back pain without sciatica,...   0 Result Notes  Details    Reading Physician Reading Date Result Priority   Stephania Allen M.D.  [phone] 2021 Urgent Care   Narrative & Impression     2021 3:20 PM     HISTORY/REASON FOR EXAM: Injury. Pain.        TECHNIQUE/EXAM DESCRIPTION AND NUMBER OF VIEWS:  3 views of the sacrum and coccyx.     COMPARISON: None.     FINDINGS:  No fracture or dislocation is seen. SI joints are symmetric. There is a moderate amount of colonic stool. Femoral heads are seated within the acetabula. There is no diastases of the symphysis pubis.     IMPRESSION:     1.  No fracture or dislocation is seen.  2.  Moderate amount of colonic stool.        I personally reviewed the images. Rad reports reviewed with her and copies of reports to her.      Medical Decision Makin. Acute low back pain without sciatica, unspecified back pain laterality  - DX-LUMBAR SPINE-2 OR 3 VIEWS; Future  - DX-SACRUM AND COCCYX 2+; Future  - predniSONE (DELTASONE) 20 MG Tab; 2 TABS BY MOUTH ONCE A DAY ON DAYS 1-3, 1 TAB ONCE A DAY ON DAYS 4-6. TAKE WITH FOOD.  Dispense: 9 Tablet; Refill: 0  - cyclobenzaprine (FLEXERIL) 10 mg Tab; 1 TAB BY MOUTH EVERY 8 HOURS ONLY IF NEEDED FOR PAIN, MUSCLE SPASM, AND/OR MUSCLE TIGHTNESS. MAY CAUSE DROWSINESS.  Dispense: 21 Tablet; Refill: 0    2. Lumbar sprain, initial encounter  -  predniSONE (DELTASONE) 20 MG Tab; 2 TABS BY MOUTH ONCE A DAY ON DAYS 1-3, 1 TAB ONCE A DAY ON DAYS 4-6. TAKE WITH FOOD.  Dispense: 9 Tablet; Refill: 0  - cyclobenzaprine (FLEXERIL) 10 mg Tab; 1 TAB BY MOUTH EVERY 8 HOURS ONLY IF NEEDED FOR PAIN, MUSCLE SPASM, AND/OR MUSCLE TIGHTNESS. MAY CAUSE DROWSINESS.  Dispense: 21 Tablet; Refill: 0      Discussed with her DDX, management options, and risks, benefits, and alternatives to treatment plan agreed upon.    She picked up a bed possibly 6 days ago or less, felt pop and pain in lower back/coccyx area. It seemed to be getting better with rest, time, OTC NSAID, and marijuana. However, bent over last night and felt the pop and pain again.    Tender in midline lower L-spine and coccyx area with palpation and lumbar flexion and extension. Moderately decreased lumbar flexion and extension due to pain.    X-rays of L-spine and Coccyx today: No fracture or dislocation is seen. Moderate amount of colonic stool.    Likely MSK inflammation and/or muscle tightness/spasm as cause of symptoms.     Rec'd relative rest.    She declines Toradol IM.    Agreeable to medications prescribed for potent anti-inflammatory treatment and muscle relaxer as needed.    Discussed expected course of duration, time for improvement, and to seek follow-up in Emergency Room, urgent care, or with PCP if getting worse at any time or not improving within expected time frame.

## 2022-02-20 ENCOUNTER — OFFICE VISIT (OUTPATIENT)
Dept: URGENT CARE | Facility: PHYSICIAN GROUP | Age: 27
End: 2022-02-20
Payer: MEDICAID

## 2022-02-20 ENCOUNTER — HOSPITAL ENCOUNTER (OUTPATIENT)
Facility: MEDICAL CENTER | Age: 27
End: 2022-02-20
Attending: FAMILY MEDICINE
Payer: MEDICAID

## 2022-02-20 VITALS
TEMPERATURE: 98 F | BODY MASS INDEX: 18.64 KG/M2 | WEIGHT: 101.3 LBS | DIASTOLIC BLOOD PRESSURE: 62 MMHG | SYSTOLIC BLOOD PRESSURE: 100 MMHG | OXYGEN SATURATION: 100 % | HEIGHT: 62 IN | RESPIRATION RATE: 14 BRPM | HEART RATE: 124 BPM

## 2022-02-20 DIAGNOSIS — N10 ACUTE PYELONEPHRITIS: ICD-10-CM

## 2022-02-20 DIAGNOSIS — R30.0 DYSURIA: ICD-10-CM

## 2022-02-20 LAB
APPEARANCE UR: CLEAR
BILIRUB UR STRIP-MCNC: NORMAL MG/DL
COLOR UR AUTO: NORMAL
GLUCOSE UR STRIP.AUTO-MCNC: 100 MG/DL
INT CON NEG: NORMAL
INT CON POS: NORMAL
KETONES UR STRIP.AUTO-MCNC: 160 MG/DL
LEUKOCYTE ESTERASE UR QL STRIP.AUTO: NORMAL
NITRITE UR QL STRIP.AUTO: NORMAL
PH UR STRIP.AUTO: 5 [PH] (ref 5–8)
POC URINE PREGNANCY TEST: NORMAL
PROT UR QL STRIP: 100 MG/DL
RBC UR QL AUTO: NORMAL
SP GR UR STRIP.AUTO: 1.01
UROBILINOGEN UR STRIP-MCNC: 2 MG/DL

## 2022-02-20 PROCEDURE — 81025 URINE PREGNANCY TEST: CPT | Performed by: FAMILY MEDICINE

## 2022-02-20 PROCEDURE — 99214 OFFICE O/P EST MOD 30 MIN: CPT | Mod: 25 | Performed by: FAMILY MEDICINE

## 2022-02-20 PROCEDURE — 81002 URINALYSIS NONAUTO W/O SCOPE: CPT | Performed by: FAMILY MEDICINE

## 2022-02-20 PROCEDURE — 87077 CULTURE AEROBIC IDENTIFY: CPT

## 2022-02-20 PROCEDURE — 87086 URINE CULTURE/COLONY COUNT: CPT

## 2022-02-20 PROCEDURE — 87186 SC STD MICRODIL/AGAR DIL: CPT

## 2022-02-20 RX ORDER — CIPROFLOXACIN 500 MG/1
TABLET, FILM COATED ORAL
Qty: 20 TABLET | Refills: 0 | Status: CANCELLED | OUTPATIENT
Start: 2022-02-20 | End: 2022-03-02

## 2022-02-20 RX ORDER — IBUPROFEN 800 MG/1
TABLET ORAL
Qty: 30 TABLET | Refills: 0 | Status: SHIPPED | OUTPATIENT
Start: 2022-02-20 | End: 2023-07-06

## 2022-02-20 RX ORDER — SULFAMETHOXAZOLE AND TRIMETHOPRIM 800; 160 MG/1; MG/1
1 TABLET ORAL EVERY 12 HOURS
Qty: 28 TABLET | Refills: 0 | Status: SHIPPED | OUTPATIENT
Start: 2022-02-20 | End: 2022-03-06

## 2022-02-20 ASSESSMENT — FIBROSIS 4 INDEX: FIB4 SCORE: 0.34

## 2022-02-20 NOTE — PROGRESS NOTES
Chief Complaint:    Chief Complaint   Patient presents with   • Follow-Up   • Painful Urination     Pt states she just had a kidney infection and is experiencing the same symptoms as before but on the opposite side       History of Present Illness:    She was treated for kidney infection by other medical facility with Cefdinir x 10 days, shows me Rx on her phone, this was prescribed on 1/29/22. She improved while on this medication. 2 days ago, she started with symptoms with fever, chills, dysuria, urinary frequency, urinary urgency, and discomfort in left kidney region. She took Azo for symptoms. She reports history of multiple UTIs in the past and she has taken Bactrim in the past for UTI which has worked and been tolerated and sometimes has needed to take Bactirm x 2 weeks.      Past Medical History:    History reviewed. No pertinent past medical history.    Past Surgical History:    History reviewed. No pertinent surgical history.    Social History:    Social History     Socioeconomic History   • Marital status: Single     Spouse name: Not on file   • Number of children: Not on file   • Years of education: Not on file   • Highest education level: Not on file   Occupational History   • Not on file   Tobacco Use   • Smoking status: Former Smoker   • Smokeless tobacco: Never Used   Vaping Use   • Vaping Use: Never used   Substance and Sexual Activity   • Alcohol use: Not Currently   • Drug use: Yes     Types: Marijuana   • Sexual activity: Not on file   Other Topics Concern   • Not on file   Social History Narrative   • Not on file     Social Determinants of Health     Financial Resource Strain: Not on file   Food Insecurity: Not on file   Transportation Needs: Not on file   Physical Activity: Not on file   Stress: Not on file   Social Connections: Not on file   Intimate Partner Violence: Not on file   Housing Stability: Not on file     Family History:    History reviewed. No pertinent family  "history.    Medications:    No current outpatient medications on file prior to visit.     No current facility-administered medications on file prior to visit.     Allergies:    Allergies   Allergen Reactions   • Vancomycin        Vitals:    Vitals:    02/20/22 1410   BP: 100/62   Pulse: (!) 124   Resp: 14   Temp: 36.7 °C (98 °F)   SpO2: 100%   Weight: 45.9 kg (101 lb 4.8 oz)   Height: 1.575 m (5' 2\")       Physical Exam:    Constitutional: Vital signs reviewed. Appears well-developed and well-nourished. No acute distress.   Eyes: Sclera white, conjunctivae clear.   ENT: External ears normal. Hearing normal.  Neck: Neck supple.   Pulmonary/Chest: Respirations non-labored.   Musculoskeletal: Positive left CVA TTP. No right CVA TTP. Normal gait. No muscular atrophy or weakness.  Neurological: Alert and oriented to person, place, and time. Muscle tone normal. Coordination normal.    Skin: No rashes or lesions. Warm, dry, normal turgor.  Psychiatric: Normal mood and affect. Behavior is normal. Judgment and thought content normal.       Diagnostics:    POCT Urinalysis  Order: [id number]   Status: Final result     Visible to patient: No (scheduled for 2/21/2022 12:42 PM)     Next appt: None     Dx: Dysuria     0 Result Notes     Ref Range & Units  2:41 PM   POC Color Negative red    POC Appearance Negative clear    POC Leukocyte Esterase Negative large    POC Nitrites Negative pos    POC Urobiligen Negative (0.2) mg/dL 2.0    POC Protein Negative mg/dL 100    POC Urine PH 5.0 - 8.0 5.0    POC Blood Negative small    POC Specific Gravity <1.005 - >1.030 1.015    POC Ketones Negative mg/dL 160    POC Bilirubin Negative mg/dL neg    POC Glucose Negative mg/dL 100    Resulting Agency  [facility]              Specimen Collected: 02/20/22  2:41 PM Last Resulted: 02/20/22  2:42 PM           POCT PREGNANCY  Order: [id number]   Status: Final result     Visible to patient: Tyesha (scheduled for 2/21/2022 12:28 PM)     Next appt: None "     Dx: Dysuria     0 Result Notes    Component Ref Range & Units  2:28 PM 4 mo ago   POC Urine Pregnancy Test Negative neg  negative    Internal Control Positive  Valid  Valid    Internal Control Negative  Valid  Valid    Resulting Agency  [facility]              Specimen Collected: 22  2:28 PM Last Resulted: 22  2:28 PM             Medical Decision Makin. Dysuria  - POCT PREGNANCY  - POCT Urinalysis    2. Acute pyelonephritis  - sulfamethoxazole-trimethoprim (BACTRIM DS) 800-160 MG tablet; Take 1 Tablet by mouth every 12 hours for 14 days.  Dispense: 28 Tablet; Refill: 0  - ibuprofen (MOTRIN) 800 MG Tab; 1 TAB BY MOUTH EVERY 8 HOURS ONLY IF NEEDED FOR PAIN. TAKE WITH FOOD.  Dispense: 30 Tablet; Refill: 0  - URINE CULTURE(NEW); Future      Discussed with her DDX, management options, and risks, benefits, and alternatives to treatment plan agreed upon.    She was treated for kidney infection by other medical facility with Cefdinir x 10 days, shows me Rx on her phone, this was prescribed on 22. She improved while on this medication. 2 days ago, she started with symptoms with fever, chills, dysuria, urinary frequency, urinary urgency, and discomfort in left kidney region. She took Azo for symptoms. She reports history of multiple UTIs in the past and she has taken Bactrim in the past for UTI which has worked and been tolerated and sometimes has needed to take Bactirm x 2 weeks.    Positive left CVA TTP.    Urine HCG: negative.    Urine dipstick: multiple abnormalities, but she took Azo which makes result less reliable.    Clinically consistent with Pyelonephritis and is agreeable to treat as such.    She prefers different antibiotic than recent Cefdinir. She declines Rocephin IM. She requests Ibuprofen Rx for pain.    Agreeable to medications prescribed and send urine for culture.    Discussed expected course of duration, time for improvement, and to seek follow-up in Emergency  Room, urgent care, or with PCP if getting worse at any time or not improving within expected time frame.

## 2022-02-21 DIAGNOSIS — N10 ACUTE PYELONEPHRITIS: ICD-10-CM

## 2022-02-24 LAB
BACTERIA UR CULT: ABNORMAL
BACTERIA UR CULT: ABNORMAL
SIGNIFICANT IND 70042: ABNORMAL
SITE SITE: ABNORMAL
SOURCE SOURCE: ABNORMAL

## 2023-01-08 ENCOUNTER — APPOINTMENT (OUTPATIENT)
Dept: URGENT CARE | Facility: PHYSICIAN GROUP | Age: 28
End: 2023-01-08

## 2023-07-06 ENCOUNTER — OFFICE VISIT (OUTPATIENT)
Dept: URGENT CARE | Facility: PHYSICIAN GROUP | Age: 28
End: 2023-07-06
Payer: MEDICAID

## 2023-07-06 VITALS
RESPIRATION RATE: 14 BRPM | DIASTOLIC BLOOD PRESSURE: 92 MMHG | TEMPERATURE: 99.1 F | BODY MASS INDEX: 18.29 KG/M2 | WEIGHT: 100 LBS | SYSTOLIC BLOOD PRESSURE: 124 MMHG | HEART RATE: 113 BPM | OXYGEN SATURATION: 98 %

## 2023-07-06 DIAGNOSIS — R55 SYNCOPE, UNSPECIFIED SYNCOPE TYPE: ICD-10-CM

## 2023-07-06 PROCEDURE — 99213 OFFICE O/P EST LOW 20 MIN: CPT | Performed by: FAMILY MEDICINE

## 2023-07-06 PROCEDURE — 3080F DIAST BP >= 90 MM HG: CPT | Performed by: FAMILY MEDICINE

## 2023-07-06 PROCEDURE — 3074F SYST BP LT 130 MM HG: CPT | Performed by: FAMILY MEDICINE

## 2023-07-06 RX ORDER — PREDNISONE 20 MG/1
TABLET ORAL
COMMUNITY
Start: 2023-04-29 | End: 2023-07-06

## 2023-07-06 RX ORDER — AMOXICILLIN AND CLAVULANATE POTASSIUM 875; 125 MG/1; MG/1
1 TABLET, FILM COATED ORAL 2 TIMES DAILY
COMMUNITY
Start: 2023-04-27 | End: 2023-07-06

## 2023-07-06 RX ORDER — ALBUTEROL SULFATE 90 UG/1
2 AEROSOL, METERED RESPIRATORY (INHALATION) EVERY 6 HOURS
COMMUNITY
Start: 2023-04-27 | End: 2023-07-06

## 2023-07-06 RX ORDER — PREDNISONE 50 MG/1
TABLET ORAL
COMMUNITY
Start: 2023-04-27 | End: 2023-07-06

## 2023-07-06 ASSESSMENT — ENCOUNTER SYMPTOMS: FEVER: 0

## 2023-07-06 NOTE — PROGRESS NOTES
Subjective:     Kim Brice is a 28 y.o. female who presents for Referral Needed (Neurologist )    HPI  Pt presents for evaluation of an acute problem  Pt had possible seizure last week   Seen in [facility]   Pt does not remember the episode   Took to ER from EMS   Thought to have had a seizure, and referred to neurology   Pt called neurologist and does not take insurance   Requests new referral   Has not had any new episodes since hospital visit     Review of Systems   Constitutional:  Negative for fever.     PMH:  has no past medical history on file.  MEDS: No current outpatient medications on file.  ALLERGIES:   Allergies   Allergen Reactions    Vancomycin      SURGHX: No past surgical history on file.  SOCHX:  reports that she has quit smoking. She has never used smokeless tobacco. She reports that she does not currently use alcohol. She reports current drug use. Drug: Marijuana.     Objective:   BP (!) 124/92   Pulse (!) 113   Temp 37.3 °C (99.1 °F) (Temporal)   Resp 14   Wt 45.4 kg (100 lb)   SpO2 98%   BMI 18.29 kg/m²     Physical Exam  Constitutional:       General: She is not in acute distress.     Appearance: She is well-developed. She is not diaphoretic.   Pulmonary:      Effort: Pulmonary effort is normal.   Neurological:      Mental Status: She is alert.       Assessment/Plan:   Assessment    1. Syncope, unspecified syncope type  - Referral to Neurology    Patient with syncopal event which was evaluated through [facility] and discharged with outpatient follow-up through neurology.  Unfortunately, neurology office that she was referred to discuss her current insurance.  Patient having difficulties getting into her neurologist.  She requests new referral.  The referral has been made, given strict precautions if having future episodes, and reviewed other things to avoid.  She is not cleared to drive and should avoid swimming, biking, and other high-speed  or dangerous activities.  Follow-up with neurology.

## 2023-07-27 ENCOUNTER — OFFICE VISIT (OUTPATIENT)
Dept: NEUROLOGY | Facility: MEDICAL CENTER | Age: 28
End: 2023-07-27
Attending: PSYCHIATRY & NEUROLOGY
Payer: MEDICAID

## 2023-07-27 VITALS
HEIGHT: 59 IN | SYSTOLIC BLOOD PRESSURE: 100 MMHG | HEART RATE: 77 BPM | WEIGHT: 98.99 LBS | BODY MASS INDEX: 19.96 KG/M2 | OXYGEN SATURATION: 99 % | TEMPERATURE: 98.6 F | DIASTOLIC BLOOD PRESSURE: 60 MMHG

## 2023-07-27 DIAGNOSIS — F39 MOOD DISORDER (HCC): ICD-10-CM

## 2023-07-27 DIAGNOSIS — R40.20 LOSS OF CONSCIOUSNESS (HCC): ICD-10-CM

## 2023-07-27 PROCEDURE — 99211 OFF/OP EST MAY X REQ PHY/QHP: CPT | Performed by: PSYCHIATRY & NEUROLOGY

## 2023-07-27 PROCEDURE — 3078F DIAST BP <80 MM HG: CPT | Performed by: PSYCHIATRY & NEUROLOGY

## 2023-07-27 PROCEDURE — 3074F SYST BP LT 130 MM HG: CPT | Performed by: PSYCHIATRY & NEUROLOGY

## 2023-07-27 PROCEDURE — 99204 OFFICE O/P NEW MOD 45 MIN: CPT | Performed by: PSYCHIATRY & NEUROLOGY

## 2023-07-27 ASSESSMENT — PATIENT HEALTH QUESTIONNAIRE - PHQ9: CLINICAL INTERPRETATION OF PHQ2 SCORE: 0

## 2023-07-27 NOTE — PROGRESS NOTES
NEUROLOGY NEW PATIENT ENCOUNTER - 07/27/2023       Chief Complaint: Loss of consciousness      HISTORY OF PRESENTING COMPLAINT:   is a 28-year-old right-handed lady coming to neurology clinic for recent episode of loss of consciousness.  This was her first visit with us.  I was unable to review the emergency room notes associated with this episode and all history is obtained from talking to the patient .  She was accompanied by her boyfriend who she lives with for today's clinic visit.    Patient mentioned she had her first seizure in the setting of drug overdose in 2016.  She attempted suicide with excessive Benadryl and was told there was twitching movements before she was taken to the hospital and she was in a medically induced coma with concern for seizures at that time.  Patient was discharged on carbamazepine and levetiracetam, but both of them were discontinued due to concern for a rash within a month.  Since that time she was not put on any antiepileptic medication and did not have any additional episodes of concern till June 2023.    She reported multiple episodes in June where she would wake up in the morning abruptly from her sleep and had a sensation of her brain feeling awake but a body unable to respond.  She also gets a feeling of urgency and has to walk around, this would happen almost on a daily basis for almost 6 days always in the morning.  Then on July 1, 2023 she had the similar sensation in the morning and she went to lay back in the room.  Her boyfriend heard a loud scream from the room and found her to be confused with bilateral upper extremities flexed and tight and lower extremities extended with shaking lasting for 4-5 minutes.  She was taken to [facility] and had head CT and blood work, which per the patient was all within normal limits and she was discharged home.  She has not had any other episodes of full body shaking or loss of consciousness.    There  were no episodes for almost 2 to 3 weeks but then her episodes started happening again with the last episode in July 23, 2023.  She does not lose full consciousness with these episodes, and is aware of them but does not know how long it will last for there have not been witnesses to these episodes.  The boyfriend has only witnessed whole body shaking episode on July 1 .    Patient is struggled with mood issues for many years but not being followed through a psychiatrist or therapist at least in the last year.  She mentioned not being on medications for mood control for more than 3 to 4 years.  She continues to smoke marijuana multiple times a day to help with her anxiety and fibromyalgia.  She denied any recent suicidal ideation but is aware of aggravated anxiety with her new onset episodes.      She cannot think of any clear trigger factors for her new onset episodes since June 2023.  She denied any focal weakness in upper or lower extremities, no difficulty with speech or swallowing or falls.  She does not drive and does not drink alcohol and denied any other drug use .    Epilepsy risk factors; patient was born full-term and denied any developmental delay or any febrile seizures prolonged hospital stay or meningitis no episodes of concern as a child.  She is adopted and is not aware of any family history of epilepsy    Previous Investigations:  None available to review    CURRENT MEDICATIONS AT THE TIME OF THIS ENCOUNTER:  No current outpatient medications on file.     PAST MEDICAL HISTORY:  Depression/anxiety  H/o Suicide attempt  Fibromyalgia   Seizure 07/2023    PAST SURGICAL HISTORY:  No past surgical history on file.     FAMILY HISTORY:  No children  Adopted when born, has biological siblings health history unclear    SOCIAL HISTORY:    Lives with boyfriend and 2 roommates  Alcohol: not drinking anymore for many years  Smoke marijuana: 5-6 times a day  No drugs use  Vape nicotine, last worked 10 years  "ago    Social History     Tobacco Use    Smoking status: Former    Smokeless tobacco: Never   Vaping Use    Vaping Use: Never used   Substance Use Topics    Alcohol use: Not Currently    Drug use: Yes     Types: Marijuana          Review of systems:      All other systems were reviewed and negative other than the ones mentioned in HPI.    EXAM:   Ambulatory Vitals:  /60 (BP Location: Right arm, Patient Position: Sitting, BP Cuff Size: Adult)   Pulse 77   Temp 37 °C (98.6 °F) (Temporal)   Ht 1.499 m (4' 11\")   Wt 44.9 kg (98 lb 15.8 oz)   SpO2 99%        Physical Exam:   Neurological Exam:  Higher Mental Function:   Awake, alert and oriented to place, person and time  Able to answer questions appropriately and follow commands well  Memory intact to immediate recall and remote events  Speech is clear and language fluent   Mood: appeared anxious    Cranial Nerves:  CN II: Pupils equal and reactive, no visual field deficits on confrontation  CN III,IV, VI : EOM intact with no nystagmus  CN V: Facial sensation intact  CN VII: No facial asymmetry  CN VIII: Intact hearing to conversation  CN IX, X: palate elevates symmetrically   CN XI: Symmetric shoulder shrug  CN XII: tongue midline. No signs of tongue biting or fasciculations    Motor: 5/5  strength in bilateral upper and lower extremities, No tremor at rest or on outstretched hands. Tone normal and no fasciculations  Sensory: Intact to light touch, temperature, and vibration in all 4 extremities  Reflexes: 2+ and symmetric in all 4 extremities  Coordination: Intact to finger to nose in both upper extremities, no truncal or appendicular ataxia  Gait: Normal gait, without the use of any assistance. No difficulty getting up from the chair     General:   Patient in no acute distress, pleasant and cooperative.  HEENT: Normocephalic, no signs of acute trauma.   Neck: Supple. There is normal range of motion.     ASSESSMENT AND PLAN:  Loss of " "consciousness:   is a 28-year-old right-handed lady coming to neurology clinic for her new onset episodes in June 2023.  She described weird feeling of \"waking up abruptly\" in the morning and not feeling well, lasting for a few hours.  She does not lose consciousness for these episodes, but does not feel right . She had these episodes on a daily basis for up to 6 days in AM on waking up and on the 7-day this was followed by an episode of loss of consciousness with whole body shaking.  The boyfriend reported her hearing a scream from her room and her body upper extremities flexed and tight and lower extremities extended with loss of consciousness suggestive of her whole body convulsion.  She was seen in the emergency room following this event.  Per the patient her blood work and head CT were all within normal's and no other clear triggers were identified.  She did not have any additional episodes of whole body convulsion since 07/01/2023 but mentioned waking up in the morning and not feeling well.  There is no childhood history of seizures    No episodes of concern till 2023.  Patient mentioned being started on antiepileptic medication after her episode of drug overdose in 2016 but not continued due to concern for a rash.  The semiology of her episodes raise concern for a focal onset epilepsy.  However with multiple contributing factors and poorly controlled mood issues, we will get more conclusive evidence with the help of ambulatory EEG.    If patient has any breakthrough seizures without clear trigger factors, antiepileptic medications can be started sooner before she completes her outpatient work-up.  We talked about her high risk for having breakthrough events and factors which can lower seizure threshold.  We talked about safety precautions for people with history of seizures and driving restrictions.  Patient does not drive MRI of the brain would also be recommended if not previously done. We will try " to mag her previous records.     2. Mood disorder:  There is longstanding history of mood issues with depression and anxiety and previous history of suicide attempt in 2016.  Patient denied any active suicidal ideation but continues to struggle with her anxiety.  I recommended reestablishing care with her psychiatrist/therapist.     She will follow up with me in 4-5 weeks. She will continue to follow-up closely with their primary care physician for other medical comorbidities.  If there are any breakthrough episodes of concern, or new symptoms, she will reach out to our clinic or seek immediate medical attention for any urgent matters.     Total time of the visit was 49 minutes including time spent in precharting, review of the previous history and test results, documentation, discussing medication safety, side effects, ordering labs, reviewing plan of care and answering patient's questions .    EDUCATION, COUNSELING:    - Education was provided to the patient and/or family regarding diagnosis and prognosis. The chronic and unpredictable nature of the condition were discussed. There is increased risk for additional events, which may carry potential for significant injuries and death. Discussed frequent seizure triggers: sleep deprivation, medication non-compliance, use of illegal drugs/alcohol, stress, and others.       -Counseling was also provided on potential effects of alcohol and other drugs, which may lower seizure threshold . We recommend avoidance of alcohol and illegal drugs and avoid sleep deprivation.     - Patient does not drive    -Other seizure precautions were discussed at length, including no diving, no skydiving, no climbing or exposure to unprotected heights, no unsupervised swimming, no Jacuzzi or bathing in bathtubs or deep bodies of water. The patient/family have been advised about risks for operating any machinery while suffering from seizures / syncope / epilepsy and/or while taking  antiepileptic drugs.     -The patient understands and agrees that due to the complexity of her diagnosis, results of any testing and further recommendations will typically be discussed/made during a face to face encounter in office. The patient and/or family further understands it is their responsibility to keep proper follow up.      Please relay any pressing concerns to our office, either via MyChart, or by phone; if not able to reach us please visit nearby Urgent Care Center or Emergency Department.  If any emergent medical needs, please seek emergent medical help and/or call 911.      Please call for help (crisis line and/or 911) in case you have thoughts of harming yourself and/or others.    Jack Green MD, MHS  [facility]

## 2023-08-23 ENCOUNTER — OFFICE VISIT (OUTPATIENT)
Dept: NEUROLOGY | Facility: MEDICAL CENTER | Age: 28
End: 2023-08-23
Attending: PSYCHIATRY & NEUROLOGY
Payer: MEDICAID

## 2023-08-23 VITALS
BODY MASS INDEX: 19.56 KG/M2 | OXYGEN SATURATION: 97 % | TEMPERATURE: 97.5 F | SYSTOLIC BLOOD PRESSURE: 100 MMHG | HEIGHT: 59 IN | WEIGHT: 97 LBS | HEART RATE: 84 BPM | DIASTOLIC BLOOD PRESSURE: 68 MMHG

## 2023-08-23 DIAGNOSIS — R40.20 LOSS OF CONSCIOUSNESS (HCC): ICD-10-CM

## 2023-08-23 DIAGNOSIS — F39 MOOD DISORDER (HCC): ICD-10-CM

## 2023-08-23 PROCEDURE — 3078F DIAST BP <80 MM HG: CPT | Performed by: PSYCHIATRY & NEUROLOGY

## 2023-08-23 PROCEDURE — 99215 OFFICE O/P EST HI 40 MIN: CPT | Performed by: PSYCHIATRY & NEUROLOGY

## 2023-08-23 PROCEDURE — 3074F SYST BP LT 130 MM HG: CPT | Performed by: PSYCHIATRY & NEUROLOGY

## 2023-08-23 PROCEDURE — 99211 OFF/OP EST MAY X REQ PHY/QHP: CPT | Performed by: PSYCHIATRY & NEUROLOGY

## 2023-08-23 NOTE — PROGRESS NOTES
NEUROLOGY FOLLOW UP VISIT NOTE     Chief Complaint: Loss of consciousness    INTERVAL HISTORY FROM PREVIOUS CLINIC VISIT :    is a 28-year-old right-handed lady coming to neurology clinic for recent episode of loss of consciousness.  She was previously seen in July 2023 and details of her complaints are documented in my initial consult note from 7/27/2023 . I was unable to review the emergency room notes associated with July episode and all history is obtained from talking to the patient .  She was accompanied by her boyfriend who she lives with for today's clinic visit.    Since her last clinic visit she denied any episodes of full body shaking or loss of consciousness but continues to have morning episodes which are all similar.  Her episodes are usually between 6 and 8 AM.  If she has a bad night sleep and constantly wakes up she does not have episodes.  After good night sleep she feels she wakes up into having a seizure-like episode.  She usually takes her boyfriend or calls out from her room, and the boyfriend reported on reaching the room she can make inappropriate sounds but without full loss of consciousness.  Patient mentioned feeling very scared and episodes lasting up to 2 to 3 minutes she does not talk much and usually grabs the boyfriend' arm.  She mentioned this as a feeling of going to have a seizure.  Since her last clinic visit patient has reported 6 episodes of concern.    She is scheduled to see a therapist, to address her anxiety issues.  Patient is aware of some of her anxiety issues but denied any suicidal or homicidal ideation.  Patient mentioned there is some short-term memory issues which is also reported by her boyfriend.  No episodes of abnormal involuntary movements, or loss of consciousness.  She denies any focal weakness in upper or lower extremities or sensory level.    Patient is struggled with mood issues for many years  but not being followed through a psychiatrist or therapist at least in the last year.  She mentioned not being on medications for mood control for more than 3 to 4 years.  She continues to smoke marijuana multiple times a day to help with her anxiety and fibromyalgia.  She denied any recent suicidal ideation but is aware of aggravated anxiety with her new onset episodes.       Epilepsy risk factors; patient was born full-term and denied any developmental delay or any febrile seizures prolonged hospital stay or meningitis no episodes of concern as a child.  She is adopted and is not aware of any family history of epilepsy.    REVIEW OF HISTORY OF PRESENTING COMPLAINT:  In summary, she mentioned she had her first seizure in the setting of drug overdose in 2016.  She attempted suicide with excessive Benadryl and was told there was twitching movements before she was taken to the hospital and she was in a medically induced coma with concern for seizures at that time.  Patient was discharged on carbamazepine and levetiracetam, but both of them were discontinued due to concern for a rash within a month.  Since that time she was not put on any antiepileptic medication and did not have any additional episodes of concern till June 2023.     She reported multiple episodes in June 2023 where she would wake up in the morning, between 6 and 8 am abruptly from her sleep and had a sensation of her brain feeling awake but body unable to respond.  She gets a feeling of urgency and has to walk around, this would happen almost on a daily basis for almost 6 days in the morning. On the 7th day on July 1, 2023 she had the similar sensation in the morning and she went to lay back in the room.  Her boyfriend heard a loud scream from the room and found her to be confused with bilateral upper extremities flexed and tight and lower extremities extended with shaking lasting for 4-5 minutes.  She was taken to [facility] and  "had head CT and blood work, which per the patient was all within normal limits and she was discharged home.       There were no episodes for almost 2 to 3 weeks but then her episodes started happening again with the last episode in July 23, 2023.  She does not lose consciousness with these episodes, and is aware. The boyfriend has only witnessed whole body shaking episode on July 1, 2023 .     Previous Investigations:  None available to review    CURRENT MEDICATIONS AT THE TIME OF THIS ENCOUNTER:  No current outpatient medications on file.     PAST MEDICAL HISTORY:  Depression/anxiety  H/o Suicide attempt  Fibromyalgia   Seizure 07/2023    PAST SURGICAL HISTORY:  No past surgical history on file.     FAMILY HISTORY:  No family history on file.     SOCIAL HISTORY:  Social History     Tobacco Use    Smoking status: Former    Smokeless tobacco: Never   Vaping Use    Vaping Use: Never used   Substance Use Topics    Alcohol use: Not Currently    Drug use: Yes     Types: Marijuana      Review of systems:      All other systems are reviewed and negative other than the ones mentioned in HPI.     EXAM:   Ambulatory Vitals:  /68 (BP Location: Right arm, Patient Position: Sitting, BP Cuff Size: Adult)   Pulse 84   Temp 36.4 °C (97.5 °F) (Temporal)   Ht 1.499 m (4' 11\")   Wt 44 kg (97 lb)   SpO2 97%      Physical Exam:   Neurological Exam:  Higher Mental Function:   Awake, alert and oriented to place, person and time  Able to answer questions appropriately and follow commands well  Memory intact to immediate recall and remote events  Speech is clear and language fluent   Mood: appeared anxious     Cranial Nerves:  CN II: Pupils equal and reactive, no visual field deficits on confrontation  CN III,IV, VI : EOM intact with no nystagmus  CN V: Facial sensation intact  CN VII: No facial asymmetry  CN VIII: Intact hearing to conversation  CN IX, X: palate elevates symmetrically   CN XI: Symmetric shoulder shrug  CN XII: " "tongue midline. No signs of tongue biting or fasciculations     Motor: 5/5  strength in bilateral upper and lower extremities, No tremor at rest or on outstretched hands. Tone normal and no fasciculations  Sensory: Intact to light touch, temperature, and vibration in all 4 extremities  Reflexes: 2+ and symmetric in all 4 extremities  Coordination: Intact to finger to nose in both upper extremities, no truncal or appendicular ataxia  Gait: Normal gait, without the use of any assistance. No difficulty getting up from the chair      General:   Patient in no acute distress, pleasant and cooperative.  HEENT: Normocephalic, no signs of acute trauma.   Neck: Supple. There is normal range of motion.      ASSESSMENT AND PLAN:  Loss of consciousness:   is a 28-year-old right-handed lady coming to neurology clinic for her new onset episodes in June 2023.  She described weird feeling of \"waking up abruptly\" in the morning and not feeling well, lasting for few hours.  She does not lose consciousness for these episodes, but does not feel right . She had these episodes on a daily basis for up to 6 days in AM on waking up and on the 7-day this was followed by an episode of loss of consciousness with whole body shaking.  The boyfriend reported her hearing a scream from her room and her body upper extremities flexed and tight and lower extremities extended with loss of consciousness suggestive of her whole body convulsion.  She was seen in the emergency room following this event.  Per the patient her blood work and head CT were all within normal's and no other clear triggers were identified.  She did not have any additional episodes of whole body convulsion since 07/01/2023 but mentioned waking up in the morning and not feeling well.  There is no childhood history of seizures     No episodes of concern till 2023.  Patient mentioned being started on antiepileptic medication after her episode of drug overdose in 2016 but not " continued due to concern for a rash.  The semiology of her episodes raise concern for a focal onset epilepsy.  However with multiple contributing factors and poorly controlled mood issues, and the description of the boyfriend when patient does not speak but holds his arm, she is scheduled for an ambulatory EEG next week to try and capture these episodes.  Since her last clinic visit with me in July she reported up to 6 episodes but feels this could be happening more frequently in the mornings .  She has not had any episodes after 8 AM .     If patient has any breakthrough seizures without clear trigger factors, antiepileptic medications can be started sooner before she completes her outpatient work-up.  We talked about her high risk for having breakthrough events and factors which can lower seizure threshold.  We talked about safety precautions for people with history of seizures and driving restrictions.  Patient does not drive. MRI of the brain would also be recommended if not previously done. We will try to mag her previous records.      2. Mood disorder:  There is longstanding history of mood issues with depression and anxiety and previous history of suicide attempt in 2016.  Patient denied any active suicidal ideation but continues to struggle with her anxiety.  She is scheduled to see her therapist soon.       She will follow up with me in 2-3 weeks, after completing her ambulatory EEG. She will continue to follow-up closely with their primary care physician for other medical comorbidities.  If there are any breakthrough episodes of concern, or new symptoms, she will reach out to our clinic or seek immediate medical attention for any urgent matters.      Total time of the visit was 42 minutes including time spent in precharting, review of the previous history and test results, documentation, discussing medication safety, side effects, reviewing plan of care and answering patient's questions .     EDUCATION,  COUNSELING:     - Education was provided to the patient and/or family regarding diagnosis and prognosis. The chronic and unpredictable nature of the condition were discussed. There is increased risk for additional events, which may carry potential for significant injuries and death. Discussed frequent seizure triggers: sleep deprivation, medication non-compliance, use of illegal drugs/alcohol, stress, and others.      -Counseling was also provided on potential effects of alcohol and other drugs, which may lower seizure threshold . We recommend avoidance of alcohol and illegal drugs and avoid sleep deprivation.      - Patient does not drive     -Other seizure precautions were discussed at length, including no diving, no skydiving, no climbing or exposure to unprotected heights, no unsupervised swimming, no Jacuzzi or bathing in bathtubs or deep bodies of water. The patient/family have been advised about risks for operating any machinery while suffering from seizures / syncope / epilepsy and/or while taking antiepileptic drugs.      -The patient understands and agrees that due to the complexity of her diagnosis, results of any testing and further recommendations will typically be discussed/made during a face to face encounter in office. The patient and/or family further understands it is their responsibility to keep proper follow up.      Please relay any pressing concerns to our office, either via MyChart, or by phone; if not able to reach us please visit nearby Urgent Care Center or Emergency Department.  If any emergent medical needs, please seek emergent medical help and/or call 911.        Please call for help (crisis line and/or 911) in case you have thoughts of harming yourself and/or others.     Jack Green MD, MHS  [facility]

## 2023-08-28 ENCOUNTER — NON-PROVIDER VISIT (OUTPATIENT)
Dept: NEUROLOGY | Facility: MEDICAL CENTER | Age: 28
End: 2023-08-28
Attending: PSYCHIATRY & NEUROLOGY
Payer: MEDICAID

## 2023-08-28 DIAGNOSIS — R40.20 LOSS OF CONSCIOUSNESS (HCC): ICD-10-CM

## 2023-08-28 PROCEDURE — 95700 EEG CONT REC W/VID EEG TECH: CPT | Performed by: PSYCHIATRY & NEUROLOGY

## 2023-08-28 PROCEDURE — 95708 EEG WO VID EA 12-26HR UNMNTR: CPT | Performed by: PSYCHIATRY & NEUROLOGY

## 2023-08-28 PROCEDURE — 95719 EEG PHYS/QHP EA INCR W/O VID: CPT | Performed by: PSYCHIATRY & NEUROLOGY

## 2023-08-28 NOTE — PROCEDURES
AMBULATORY ELECTROENCEPHALOGRAM REPORT- Day 1      REFERRING PROVIDER:  Jack Green M.D.  DOS: 2023 03;22 PM to 2023 02:35 PM  DURATION OF RECORDIN hours and 12 minutes    INDICATION:   is a 28-year-old lady with history of seizure in 2016 and recent episodes of loss of consciousness and changes in awareness since 2023.  Ambulatory EEG ordered to rule out epileptiform changes contributing to her episodes     CURRENT OUTPATIENT MEDICATION LIST:   Nil    TECHNIQUE:   The EEG was set up and taken down by a Neurodiagnostic technologist who performed education to patient and staff.  A minimum but not limited to 23 electrodes and 23 channel recording was setup and performed by Neurodiagnostic technologist. Impedances, electrode integrity, and technical impressions were documented a minimum of every 2-24 hour period by a Neurodiagnostic Technologist and reviewed by Interpreting Physician.    DESCRIPTION OF THE RECORD:  The background activity when awake consisted of 9 to 11 Hz, 20 to 40 µV activity with a posterior dominant rhythm.  During drowsiness bilateral frontal beta activity and slowing of the background rhythms were seen.  Features of N2 sleep including sleep spindles and vertex waves were noted.  Reactivity and state changes were seen.      ICTAL AND INTERICTAL FINDINGS:   There were intermittent runs of bilateral frontal delta activity of 3 to 4 Hz in bilateral frontal regions, without associated clinical correlates or evolution.  No organized seizures.    ACTIVATION PROCEDURES:   Hyperventilation was performed by the patient for a total of 3 minutes. The technician performing the test noted good effort. This technique did not elicited any abnormalities on EEG.   Photic stimulation was attempted but not completed as patient felt very anxious and could not complete the stimulation at higher frequencies.    EKG: Sampling of the EKG recording did not demonstrate any  abnormalities    EVENTS:  8 push button events, without associated diary entry and no significant EEG background changes.     INTERPRETATION:  Abnormal 23 hour ambulatory EEG recording in the awake, drowsy and sleep states due to intermittent runs of bilateral frontal delta activity seen in awake state without associated clinical correlates or evolution.  There was intermittent bilateral frontotemporal delta slowing left more than right.  No distinct epileptiform abnormalities or organized electrographic seizure activity was seen  Clinical Events: None    Jack Green MD, MHS  Department of Neurology at [facility]  Phone: [phone]  Fax: [phone]

## 2023-08-29 ENCOUNTER — NON-PROVIDER VISIT (OUTPATIENT)
Dept: NEUROLOGY | Facility: MEDICAL CENTER | Age: 28
End: 2023-08-29
Attending: PSYCHIATRY & NEUROLOGY
Payer: MEDICAID

## 2023-08-29 DIAGNOSIS — R40.20 LOSS OF CONSCIOUSNESS (HCC): ICD-10-CM

## 2023-08-29 PROCEDURE — 95719 EEG PHYS/QHP EA INCR W/O VID: CPT | Performed by: PSYCHIATRY & NEUROLOGY

## 2023-08-29 PROCEDURE — 95708 EEG WO VID EA 12-26HR UNMNTR: CPT | Performed by: PSYCHIATRY & NEUROLOGY

## 2023-08-29 NOTE — PROCEDURES
AMBULATORY ELECTROENCEPHALOGRAM REPORT- Day 2        REFERRING PROVIDER:  Jack Green M.D.  DOS: 2023 02:41 PM to 2023 02:25 PM  DURATION OF RECORDIN hours and 39 minutes     INDICATION:   is a 28-year-old lady with history of seizure in 2016 and recent episodes of loss of consciousness and changes in awareness since 2023.  Ambulatory EEG ordered to rule out epileptiform changes contributing to her episodes      CURRENT OUTPATIENT MEDICATION LIST:   Nil     TECHNIQUE:   The EEG was set up and taken down by a Neurodiagnostic technologist who performed education to patient and staff.  A minimum but not limited to 23 electrodes and 23 channel recording was setup and performed by Neurodiagnostic technologist. Impedances, electrode integrity, and technical impressions were documented a minimum of every 2-24 hour period by a Neurodiagnostic Technologist and reviewed by Interpreting Physician.     DESCRIPTION OF THE RECORD:  When awake the background consisted of 10 to 12 Hz, 20 to 40 µV activity with a posterior dominant rhythm.  During drowsiness bilateral frontal beta activity and slowing of the background rhythms were seen.  Features of N2 sleep including sleep spindles and vertex waves were noted.  Reactivity and state changes were seen.       ICTAL AND INTERICTAL FINDINGS:   3 seizures :  2023 03:28 AM: Patient had woken up from sleep and drowsy state, with EEG showing regular 6 to 8 Hz activity with evolution to higher amplitude sharper contour 3 to 5 Hz delta slowing in the left frontotemporal regions and spread to the left frontocentral regions , followed by muscle artifacts and 2 to 3 Hz activity in the left frontotemporal regions and abruptly stopping followed by left frontotemporal slowing seen for minutes.  The EEG seizure activity lasted for up to 110 seconds.  No push button activation during this event and know clinical correlate.  2023: 07:98 AM: Similar to  the previous episode with left frontotemporal onset with irregular theta activity progressing to sharper contoured theta and 2 to 3 Hz delta in the left frontotemporal regions and evolution to involve the left frontocentral regions as rhythmic delta of lower amplitude abrupt ending.  This episode lasted for 124 seconds.  No pushbutton activation during this event. This was followed by left frontotemporal background slowing lasting minutes.  08/30/2023 11:42 AM: Patient had associated diary entry of feeling weird with a sense of rushing in her head and this feeling fear and could not move.  She describes this as a typical morning episode but less intense.  EEG changes were similar to the previous event starting at left frontotemporal changes with spread to the left frontocentral regions and abrupt and.  The event lasted 84 seconds.  There is no significant focal background slowing following this event     ACTIVATION PROCEDURES:   Hyperventilation completed on 08/29/2023.  Photic stimulation was completed with good driving response noted posteriorly.  Patient reported not feeling good with the loss of eye flutter during the testing.       EKG: Sampling of the EKG recording did not demonstrate any abnormalities     EVENTS:  2 push button activations 1 to instruct the patient at the beginning of the recording and the second pushbutton activation on 8/30/2023 11;42 AM with her diary entry associated with push button activation noted feeling weird and sense of rushing in her head and that the feeling of fear she could not really move and fell back into bed.  This is similar to her typical episodes in the morning but less severe     INTERPRETATION:  Abnormal 23 hour ambulatory EEG recording in the awake, drowsy and sleep states due to:  1) 3 left fronto temporal onset seizures: with onset in the left frontotemporal region and spread to the left frontocentral regions but EEG changes restricted to the left hemisphere as  described above.  There was no associated diary entry for the first 2 episodes but  associated muscle and movement artifacts noted.  For the third event patient described having a weird feeling and sensation of fear and difficulty with moving similar to her typical episodes but less intense.  There was postictal left frontocentral slowing noted after the first 2 events.  There were no distinct interictal epileptiform changes noted.  The above findings are most suggestive of left frontotemporal epileptic foci .   Clinical correlation is advised    Clinical Events: 1 episode of typical clinical event but less intense than her usual episode on 8/30/2023 around 11:42 AM secondary to a focal seizure from the left frontotemporal regions as described above.     Jack Green MD, MHS  Department of Neurology at [facility]  Phone: [phone]  Fax: [phone]

## 2023-08-30 ENCOUNTER — NON-PROVIDER VISIT (OUTPATIENT)
Dept: NEUROLOGY | Facility: MEDICAL CENTER | Age: 28
End: 2023-08-30
Attending: PSYCHIATRY & NEUROLOGY
Payer: MEDICAID

## 2023-08-30 PROCEDURE — 99999 PR NO CHARGE: CPT | Performed by: PSYCHIATRY & NEUROLOGY

## 2023-09-06 ENCOUNTER — TELEPHONE (OUTPATIENT)
Dept: NEUROLOGY | Facility: MEDICAL CENTER | Age: 28
End: 2023-09-06
Payer: MEDICAID

## 2023-09-06 DIAGNOSIS — G40.109 FOCAL EPILEPSY (HCC): ICD-10-CM

## 2023-09-06 RX ORDER — LACOSAMIDE 50 MG/1
50 TABLET ORAL 2 TIMES DAILY
Qty: 60 TABLET | Refills: 1 | Status: SHIPPED | OUTPATIENT
Start: 2023-09-06 | End: 2023-11-05

## 2023-09-06 NOTE — TELEPHONE ENCOUNTER
Called and talked to patient and discussed her ambulatory EEG study from last week.  On 08/30/2023 at least 3 seizures captured with left frontotemporal onset and restricted to the left hemisphere suggestive of focal seizures.  There was no pushbutton activation for 2 events.  However patient mentioned episode of feeling detached with the third episode.    MRI brain would also be recommended due to the focal seizure. Will discuss and order this after her next week appointment     Called and discussed results and would recommend starting antiepileptic medication.  We will start her on lacosamide 50 mg twice daily.  Prescription sent to pharmacy.  If not covered by insurance we can consider other sodium channel blockers for focal epilepsy.  Patient was previously tried on carbamazepine many years ago.      Discussed side effects of lacosamide including risks in reproductive age group, need for contraception and suicidal ideation and mood changes.  Patient is now being followed through therapist.  If any side effects he should seek more immediate medical attention.  If there are any additional side effects she will inform our clinic.  She has follow-up appointment with me next week on 9/13/2023

## 2023-09-06 NOTE — TELEPHONE ENCOUNTER
Lacosamide (Vimpat) 50 MG Tabs    NO PA req'd - TC paid copay $0.00 #180/90 or $0.00 #60/30 DS     Request rec'd via [facility], RTC SHAGGY Bowles paid copay $0.00 #180/90 or $0.00 #60/30 DS notifying liaison Jessica Larry or Outreach. - 09/06/2023 1:35pm

## 2023-09-26 ENCOUNTER — OFFICE VISIT (OUTPATIENT)
Dept: NEUROLOGY | Facility: MEDICAL CENTER | Age: 28
End: 2023-09-26
Attending: PSYCHIATRY & NEUROLOGY
Payer: MEDICAID

## 2023-09-26 VITALS
WEIGHT: 91.49 LBS | SYSTOLIC BLOOD PRESSURE: 100 MMHG | HEIGHT: 59 IN | DIASTOLIC BLOOD PRESSURE: 58 MMHG | HEART RATE: 87 BPM | TEMPERATURE: 98.2 F | BODY MASS INDEX: 18.44 KG/M2 | OXYGEN SATURATION: 99 %

## 2023-09-26 DIAGNOSIS — R56.9 SEIZURES (HCC): ICD-10-CM

## 2023-09-26 DIAGNOSIS — R40.20 LOSS OF CONSCIOUSNESS (HCC): ICD-10-CM

## 2023-09-26 PROCEDURE — 99215 OFFICE O/P EST HI 40 MIN: CPT | Performed by: PSYCHIATRY & NEUROLOGY

## 2023-09-26 PROCEDURE — 3074F SYST BP LT 130 MM HG: CPT | Performed by: PSYCHIATRY & NEUROLOGY

## 2023-09-26 PROCEDURE — 3078F DIAST BP <80 MM HG: CPT | Performed by: PSYCHIATRY & NEUROLOGY

## 2023-09-26 PROCEDURE — 99211 OFF/OP EST MAY X REQ PHY/QHP: CPT | Performed by: PSYCHIATRY & NEUROLOGY

## 2023-09-26 RX ORDER — DEXTROAMPHETAMINE SACCHARATE, AMPHETAMINE ASPARTATE, DEXTROAMPHETAMINE SULFATE AND AMPHETAMINE SULFATE 1.25; 1.25; 1.25; 1.25 MG/1; MG/1; MG/1; MG/1
5 TABLET ORAL 2 TIMES DAILY
COMMUNITY
Start: 2023-09-01 | End: 2024-01-03

## 2023-09-26 RX ORDER — ZONISAMIDE 100 MG/1
200 CAPSULE ORAL
Qty: 60 CAPSULE | Refills: 5 | Status: SHIPPED | OUTPATIENT
Start: 2023-09-26 | End: 2024-01-03
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
## 2023-09-26 NOTE — PATIENT INSTRUCTIONS
Please take zonisamide and Vimpat as following:    Week 1: Vimpat 50 mg in AM and 50 pm in PM. Zonisamide 100 mg at bedtime.   Week 2: Vimpat 50 mg in AM and 50 pm in PM. Zonisamide 200 mg at bedtime.   Week 3: Vimpat 50 mg in AM. Zonisamide 200 mg at bedtime.   Week 4: Stop Vimpat. Continue Zonisamide 200 mg at bedtime.       Please let our office know if you have any changes in your seizure frequency and/characteristics. Otherwise, please keep the diary of your events and bring it with you at the time of your next follow up visit with our office.     Please take vitamin D3 [id number] internation units daily.     Please take folic acid 1 mg daily. This is an over-the-counter supplement that is recommended to prevent certain developmental problems in your baby, in case you become pregnant in the future.    Please let our office know as soon as you become pregnant or plan to become pregnant.    If you are caring for a baby/young child, please make sure to be sitting on a soft surface while holding your baby/young child, so in case you have a seizure, your baby/young child is not injured due to fall.     Please let us know if you observe that your baby is excessively sleepy/has other changes and the pediatrician feels that there are no other explanations except possible adverse effects of antiseizure medication(s) your are taking while nursing your baby.     Please note that the following might precipitate seizures: missed doses of antiseizure medications, being sick with fever, stress, fatigue, sleep deprivation, not eating regularly, not drinking enough water, drinking too much alcohol, stopping alcohol suddenly if you are currently using it on a regular/daily basis, and/or using recreational drugs, among others.    Please note that the following might lead to an injury, potentially a life-threatening injury, in case you have a seizure and/or lose awareness while:   - being in a large body of water by yourself, such  as bath, pool, lake, ocean, among others (risk of drowning)   - being on unprotected heights (risk of fall)   - being around and/or operating heavy machinery (risk of injury)   - being around open fire/hot surfaces (risk of burns)   - any other activities/circumstances, in which if you lose awareness, you might injure yourself and/or others.    Please call for help (crisis line and/or 911) in case you have thoughts of harming yourself and/or others.    Please abstain from driving until further notice.    ------------------------------------------------------------------------------------------  Instructions for your family/caregivers:  Please call 911 if the patient has a seizure longer than 2-3 minutes, if seizures are back to back without her recovering to her baseline, or she does not start recovering within 5-10 minutes after the seizure stops. During the seizure - please turn her on her side, please make sure her head is protected (for example, you should put a pillow under her head, if one is available), and please do not put anything in her mouth.   -------------------------------------------------------------------------------------------    It is important that your seizures are well controlled and you have none or have them rarely. In addition to avoiding injury related to breakthrough seizures, frequent seizures increase risk of SUDEP (sudden unexpected death in epilepsy), where a person goes into a seizure and then never wakes up - this is a rare complication of seizure disorder; one of the best available ways to prevent it is to control your seizures well.     Due to the high volume of patients we are trying to help, your physician will not be able to respond by phone or in MyChart to your routine concerns between appointments.  This does not reflect a lack of interest or concern for you or your diagnosis.  Please bring these questions and concerns to your appointment where your physician can answer.   Please relay more pressing concerns to our office, either via MyChart, or by phone; if not able to reach us please visit nearby Urgent Care Center or Emergency Department.  If any emergent medical needs, please seek emergent medical help and/or call 911.    Please note that we are not able to fill out paperwork that might be related to your work, utility company, disability, and/or driving, among others, in between the visits.  Please schedule a dedicated appointment to address your paperwork, so we can do that in a timely manner.  This is not due to lack of concern or interest for your disease-related work/administrative problems, but to make sure that we provide the best possible care and to fill out your paperwork in a correct and timely manner.    Thank you for entrusting your neurological care to [facility] and we look forward to continuing to serve you.

## 2023-09-27 ENCOUNTER — APPOINTMENT (OUTPATIENT)
Dept: NEUROLOGY | Facility: MEDICAL CENTER | Age: 28
End: 2023-09-27
Attending: PSYCHIATRY & NEUROLOGY
Payer: MEDICAID

## 2023-11-08 ENCOUNTER — OFFICE VISIT (OUTPATIENT)
Dept: NEUROLOGY | Facility: MEDICAL CENTER | Age: 28
End: 2023-11-08
Attending: PSYCHIATRY & NEUROLOGY
Payer: MEDICAID

## 2023-11-08 VITALS
HEIGHT: 59 IN | HEART RATE: 84 BPM | TEMPERATURE: 98.4 F | SYSTOLIC BLOOD PRESSURE: 102 MMHG | BODY MASS INDEX: 17.51 KG/M2 | DIASTOLIC BLOOD PRESSURE: 62 MMHG | OXYGEN SATURATION: 96 % | WEIGHT: 86.86 LBS

## 2023-11-08 DIAGNOSIS — R40.20 LOSS OF CONSCIOUSNESS (HCC): ICD-10-CM

## 2023-11-08 PROCEDURE — 99214 OFFICE O/P EST MOD 30 MIN: CPT | Performed by: PSYCHIATRY & NEUROLOGY

## 2023-11-08 PROCEDURE — 99211 OFF/OP EST MAY X REQ PHY/QHP: CPT | Performed by: PSYCHIATRY & NEUROLOGY

## 2023-11-08 PROCEDURE — 3074F SYST BP LT 130 MM HG: CPT | Performed by: PSYCHIATRY & NEUROLOGY

## 2023-11-08 PROCEDURE — 3078F DIAST BP <80 MM HG: CPT | Performed by: PSYCHIATRY & NEUROLOGY

## 2023-11-08 RX ORDER — LACOSAMIDE 50 MG/1
100 TABLET ORAL 2 TIMES DAILY
Qty: 120 TABLET | Refills: 2 | Status: SHIPPED | OUTPATIENT
Start: 2023-11-08 | End: 2024-02-15

## 2023-11-08 RX ORDER — LACOSAMIDE 50 MG/1
50 TABLET ORAL 2 TIMES DAILY
COMMUNITY
End: 2023-11-08 | Stop reason: SDUPTHER

## 2023-11-08 NOTE — PATIENT INSTRUCTIONS
Please take Vimpat as following:    Week 1: Vimpat 50 mg in AM and 75 mg in PM.  Week 2: Vimpat 75 mg in AM and 75 mg in PM.  Week 3: Vimpat 75 mg in AM and 100 mg in PM.  Week 4: Vimpat 100 mg in AM and 100 mg in PM and continue this dose.    Please seek emergent medical/psychiatric help if any thoughts of harming yourself/others.     Please let our office know if you have any changes in your seizure frequency and/characteristics. Otherwise, please keep the diary of your events and bring it with you at the time of your next follow up visit with our office.     Please take vitamin D3 [id number] internation units daily.     Please take folic acid 1 mg daily. This is an over-the-counter supplement that is recommended to prevent certain developmental problems in your baby, in case you become pregnant in the future.    Please let our office know as soon as you become pregnant or plan to become pregnant.    If you are caring for a baby/young child, please make sure to be sitting on a soft surface while holding your baby/young child, so in case you have a seizure, your baby/young child is not injured due to fall.     Please let us know if you observe that your baby is excessively sleepy/has other changes and the pediatrician feels that there are no other explanations except possible adverse effects of antiseizure medication(s) your are taking while nursing your baby.     Please note that the following might precipitate seizures: missed doses of antiseizure medications, being sick with fever, stress, fatigue, sleep deprivation, not eating regularly, not drinking enough water, drinking too much alcohol, stopping alcohol suddenly if you are currently using it on a regular/daily basis, and/or using recreational drugs, among others.    Please note that the following might lead to an injury, potentially a life-threatening injury, in case you have a seizure and/or lose awareness while:   - being in a large body of water by  yourself, such as bath, pool, lake, ocean, among others (risk of drowning)   - being on unprotected heights (risk of fall)   - being around and/or operating heavy machinery (risk of injury)   - being around open fire/hot surfaces (risk of burns)   - any other activities/circumstances, in which if you lose awareness, you might injure yourself and/or others.    Please call for help (crisis line and/or 911) in case you have thoughts of harming yourself and/or others.    Please abstain from driving until further notice.    ------------------------------------------------------------------------------------------  Instructions for your family/caregivers:  Please call 911 if the patient has a seizure longer than 2-3 minutes, if seizures are back to back without her recovering to her baseline, or she does not start recovering within 5-10 minutes after the seizure stops. During the seizure - please turn her on her side, please make sure her head is protected (for example, you should put a pillow under her head, if one is available), and please do not put anything in her mouth.   -------------------------------------------------------------------------------------------    It is important that your seizures are well controlled and you have none or have them rarely. In addition to avoiding injury related to breakthrough seizures, frequent seizures increase risk of SUDEP (sudden unexpected death in epilepsy), where a person goes into a seizure and then never wakes up - this is a rare complication of seizure disorder; one of the best available ways to prevent it is to control your seizures well.     Due to the high volume of patients we are trying to help, your physician will not be able to respond by phone or in [employer] to your routine concerns between appointments.  This does not reflect a lack of interest or concern for you or your diagnosis.  Please bring these questions and concerns to your appointment where your physician  can answer.  Please relay more pressing concerns to our office, either via MyChart, or by phone; if not able to reach us please visit nearby Urgent Care Center or Emergency Department.  If any emergent medical needs, please seek emergent medical help and/or call 911.    Please note that we are not able to fill out paperwork that might be related to your work, utility company, disability, and/or driving, among others, in between the visits.  Please schedule a dedicated appointment to address your paperwork, so we can do that in a timely manner.  This is not due to lack of concern or interest for your disease-related work/administrative problems, but to make sure that we provide the best possible care and to fill out your paperwork in a correct and timely manner.    Thank you for entrusting your neurological care to [facility] and we look forward to continuing to serve you.
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
## 2023-11-09 ENCOUNTER — TELEPHONE (OUTPATIENT)
Dept: NEUROLOGY | Facility: MEDICAL CENTER | Age: 28
End: 2023-11-09

## 2023-11-09 NOTE — TELEPHONE ENCOUNTER
Received Refill PA request via [employer]  for Lacosamide 50mg tab. (Quantity:120 tab, Day Supply:30)     Insurance: [employer]  Member ID:  [id number]  BIN: [id number]  PCN: [id number]  [id number]     Ran Test claim via [place] & medication Pays for a $0.00 copay. Will outreach to patient to offer specialty pharmacy services and or release to preferred pharmacy

## 2023-12-05 ENCOUNTER — TELEPHONE (OUTPATIENT)
Dept: PHARMACY | Facility: MEDICAL CENTER | Age: 28
End: 2023-12-05
Payer: MEDICAID

## 2023-12-05 NOTE — TELEPHONE ENCOUNTER
Attempted to contact patient at [phone] to discuss [facility] and services/benefits offered. No answer, left voicemail.    Ny Carlson  Rx Coordinator   [phone]

## 2024-01-03 ENCOUNTER — OFFICE VISIT (OUTPATIENT)
Dept: NEUROLOGY | Facility: MEDICAL CENTER | Age: 29
End: 2024-01-03
Attending: PSYCHIATRY & NEUROLOGY
Payer: MEDICAID

## 2024-01-03 VITALS
TEMPERATURE: 98.1 F | BODY MASS INDEX: 17.56 KG/M2 | HEART RATE: 71 BPM | OXYGEN SATURATION: 96 % | DIASTOLIC BLOOD PRESSURE: 60 MMHG | SYSTOLIC BLOOD PRESSURE: 96 MMHG | HEIGHT: 59 IN | WEIGHT: 87.08 LBS

## 2024-01-03 DIAGNOSIS — R56.9 FOCAL SEIZURES (HCC): ICD-10-CM

## 2024-01-03 DIAGNOSIS — R40.20 LOSS OF CONSCIOUSNESS (HCC): ICD-10-CM

## 2024-01-03 PROCEDURE — 99211 OFF/OP EST MAY X REQ PHY/QHP: CPT | Performed by: PSYCHIATRY & NEUROLOGY

## 2024-01-03 PROCEDURE — 99214 OFFICE O/P EST MOD 30 MIN: CPT | Performed by: PSYCHIATRY & NEUROLOGY

## 2024-01-03 PROCEDURE — 3078F DIAST BP <80 MM HG: CPT | Performed by: PSYCHIATRY & NEUROLOGY

## 2024-01-03 PROCEDURE — 3074F SYST BP LT 130 MM HG: CPT | Performed by: PSYCHIATRY & NEUROLOGY

## 2024-01-03 RX ORDER — CLOBAZAM 10 MG/1
5 TABLET ORAL NIGHTLY
Qty: 45 TABLET | Refills: 1 | Status: ON HOLD | OUTPATIENT
Start: 2024-01-03 | End: 2024-02-23

## 2024-01-03 ASSESSMENT — PATIENT HEALTH QUESTIONNAIRE - PHQ9
CLINICAL INTERPRETATION OF PHQ2 SCORE: 2
5. POOR APPETITE OR OVEREATING: 2 - MORE THAN HALF THE DAYS
SUM OF ALL RESPONSES TO PHQ QUESTIONS 1-9: 9

## 2024-01-03 NOTE — PATIENT INSTRUCTIONS
Please take Vimpat 100 mg in AM and 100 mg in PM every day.    Please take Onfi (clobazam) 5 mg every evening.     Please seek emergent medical/psychiatric help if any thoughts of harming yourself/others.     Please let our office know if you have any changes in your seizure frequency and/characteristics. Otherwise, please keep the diary of your events and bring it with you at the time of your next follow up visit with our office.     Please take vitamin D3 [id number] internation units daily.     Please take folic acid 1 mg daily. This is an over-the-counter supplement that is recommended to prevent certain developmental problems in your baby, in case you become pregnant in the future.    Please let our office know as soon as you become pregnant or plan to become pregnant.    If you are caring for a baby/young child, please make sure to be sitting on a soft surface while holding your baby/young child, so in case you have a seizure, your baby/young child is not injured due to fall.     Please let us know if you observe that your baby is excessively sleepy/has other changes and the pediatrician feels that there are no other explanations except possible adverse effects of antiseizure medication(s) your are taking while nursing your baby.     Please note that the following might precipitate seizures: missed doses of antiseizure medications, being sick with fever, stress, fatigue, sleep deprivation, not eating regularly, not drinking enough water, drinking too much alcohol, stopping alcohol suddenly if you are currently using it on a regular/daily basis, and/or using recreational drugs, among others.    Please note that the following might lead to an injury, potentially a life-threatening injury, in case you have a seizure and/or lose awareness while:   - being in a large body of water by yourself, such as bath, pool, lake, ocean, among others (risk of drowning)   - being on unprotected heights (risk of fall)   - being  around and/or operating heavy machinery (risk of injury)   - being around open fire/hot surfaces (risk of burns)   - any other activities/circumstances, in which if you lose awareness, you might injure yourself and/or others.    Please call for help (crisis line and/or 911) in case you have thoughts of harming yourself and/or others.    Please abstain from driving until further notice.    ------------------------------------------------------------------------------------------  Instructions for your family/caregivers:  Please call 911 if the patient has a seizure longer than 2-3 minutes, if seizures are back to back without her recovering to her baseline, or she does not start recovering within 5-10 minutes after the seizure stops. During the seizure - please turn her on her side, please make sure her head is protected (for example, you should put a pillow under her head, if one is available), and please do not put anything in her mouth.   -------------------------------------------------------------------------------------------    It is important that your seizures are well controlled and you have none or have them rarely. In addition to avoiding injury related to breakthrough seizures, frequent seizures increase risk of SUDEP (sudden unexpected death in epilepsy), where a person goes into a seizure and then never wakes up - this is a rare complication of seizure disorder; one of the best available ways to prevent it is to control your seizures well.     Due to the high volume of patients we are trying to help, your physician will not be able to respond by phone or in MyChart to your routine concerns between appointments.  This does not reflect a lack of interest or concern for you or your diagnosis.  Please bring these questions and concerns to your appointment where your physician can answer.  Please relay more pressing concerns to our office, either via MyChart, or by phone; if not able to reach us please  visit nearby Urgent Care Center or Emergency Department.  If any emergent medical needs, please seek emergent medical help and/or call 911.    Please note that we are not able to fill out paperwork that might be related to your work, utility company, disability, and/or driving, among others, in between the visits.  Please schedule a dedicated appointment to address your paperwork, so we can do that in a timely manner.  This is not due to lack of concern or interest for your disease-related work/administrative problems, but to make sure that we provide the best possible care and to fill out your paperwork in a correct and timely manner.    Thank you for entrusting your neurological care to [facility] and we look forward to continuing to serve you.
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
## 2024-02-13 DIAGNOSIS — R40.20 LOSS OF CONSCIOUSNESS (HCC): ICD-10-CM

## 2024-02-15 RX ORDER — LACOSAMIDE 50 MG/1
100 TABLET ORAL 2 TIMES DAILY
Qty: 120 TABLET | Refills: 2 | Status: ON HOLD | OUTPATIENT
Start: 2024-02-15 | End: 2024-02-23

## 2024-02-15 NOTE — TELEPHONE ENCOUNTER
Received request via: Pharmacy    Medication Name/Dosage Lacosamide 50 MG    When was medication last prescribed 1.03.2024    How many refills were previously provided 1    How many Refills does he patient have left from last prescription 0    Was the patient seen in the last year in this department? Yes   Date of last office visit 1.3.2024     Per last Neurology Office Visit, when was the date of next follow up visit set for?                            Date of office visit follow up request 4.3.2024     Does the patient have an upcoming appointment? Yes   If yes, when 4.3.2024             If no, schedule appointment Scheduled    Does the patient have care home Plus and need 100 day supply (blood pressure, diabetes and cholesterol meds only)? Medication is not for cholesterol, blood pressure or diabetes

## 2024-02-19 ENCOUNTER — TELEPHONE (OUTPATIENT)
Dept: NEUROLOGY | Facility: MEDICAL CENTER | Age: 29
End: 2024-02-19
Payer: MEDICAID

## 2024-02-19 NOTE — TELEPHONE ENCOUNTER
ORTHOPAEDIC PROGRESS NOTE     Patient seen and examined.  No acute orthopedic concerns.  Pain currently controlled.    Denies N/V, F/C, CP/SOB, or extremity weakness/numbness/paresthesias.    Current Vitals:  Temperature Blood Pressure Heart Rate Resp Rate SpO2   Temp: 97.9 °F (36.6 °C) BP: 119/66 Heart Rate: 82 Resp: 12  SpO2: 98 %     Physical Exam:  General appearance:   Awake, alert, cooperative, no distress    Musculoskeletal:    LLE:  Grossly NVI   +EHL/FHL intact  SILT L2-S1   +2/4 distal pulses with capillary refill <2 sec   Foot WWP  Compartments Soft; no calf TTP  Dressings c/d/i    Labs:   Lab Results   Component Value Date    HGB 12.4 02/19/2022    HGB 12.5 02/18/2022    HGB 13.3 07/29/2018    WBC 9.1 02/19/2022    WBC 5.8 02/18/2022    WBC 7.4 07/29/2018         ASSESSMENT & PLAN   · 59 yo F with left open tib fib fracture s/p I&D and Ex fix    · PT/OT- [facility]   · Keep LLE elevated  · Pain control- orals and IV for breakthrough  · Dressing- keep dressing intact  · DVT ppx- Per primary lovenox  · Discussed with attending  · Dispo- Plan for OR Wednesday        Randy Lawrence DO PGY-3 Orthopedics  5:56 AM  02/21/22  Please [facility] Oncall     S/w patient at this time.  Confirmed admission and discussed plan of care.    Current Medications:  Vimpat 50mg tabs, 100mg BID  Clobazam 5mg qhs    Last dose to be tonight, 2/19/2024.

## 2024-02-20 ENCOUNTER — HOSPITAL ENCOUNTER (INPATIENT)
Facility: MEDICAL CENTER | Age: 29
LOS: 3 days | DRG: 100 | End: 2024-02-23
Attending: PSYCHIATRY & NEUROLOGY | Admitting: PSYCHIATRY & NEUROLOGY
Payer: MEDICAID

## 2024-02-20 DIAGNOSIS — R56.9 SEIZURES (HCC): ICD-10-CM

## 2024-02-20 DIAGNOSIS — Z91.89 AT RISK FOR OSTEOPENIA: ICD-10-CM

## 2024-02-20 LAB
ALBUMIN SERPL BCP-MCNC: 4.4 G/DL (ref 3.2–4.9)
ALBUMIN/GLOB SERPL: 1.8 G/DL
ALP SERPL-CCNC: 47 U/L (ref 30–99)
ALT SERPL-CCNC: 10 U/L (ref 2–50)
AMPHET UR QL SCN: NEGATIVE
ANION GAP SERPL CALC-SCNC: 11 MMOL/L (ref 7–16)
APPEARANCE UR: CLEAR
AST SERPL-CCNC: 16 U/L (ref 12–45)
BACTERIA #/AREA URNS HPF: ABNORMAL /HPF
BARBITURATES UR QL SCN: NEGATIVE
BASOPHILS # BLD AUTO: 0.9 % (ref 0–1.8)
BASOPHILS # BLD: 0.04 K/UL (ref 0–0.12)
BENZODIAZ UR QL SCN: POSITIVE
BILIRUB SERPL-MCNC: 0.5 MG/DL (ref 0.1–1.5)
BILIRUB UR QL STRIP.AUTO: NEGATIVE
BUN SERPL-MCNC: 9 MG/DL (ref 8–22)
BZE UR QL SCN: NEGATIVE
CALCIUM ALBUM COR SERPL-MCNC: 8.4 MG/DL (ref 8.5–10.5)
CALCIUM SERPL-MCNC: 8.7 MG/DL (ref 8.5–10.5)
CANNABINOIDS UR QL SCN: POSITIVE
CHLORIDE SERPL-SCNC: 110 MMOL/L (ref 96–112)
CO2 SERPL-SCNC: 18 MMOL/L (ref 20–33)
COLOR UR: ABNORMAL
CREAT SERPL-MCNC: 0.56 MG/DL (ref 0.5–1.4)
EOSINOPHIL # BLD AUTO: 0.29 K/UL (ref 0–0.51)
EOSINOPHIL NFR BLD: 6.3 % (ref 0–6.9)
EPI CELLS #/AREA URNS HPF: NEGATIVE /HPF
ERYTHROCYTE [DISTWIDTH] IN BLOOD BY AUTOMATED COUNT: 41.6 FL (ref 35.9–50)
FENTANYL UR QL: NEGATIVE
GFR SERPLBLD CREATININE-BSD FMLA CKD-EPI: 125 ML/MIN/1.73 M 2
GLOBULIN SER CALC-MCNC: 2.5 G/DL (ref 1.9–3.5)
GLUCOSE SERPL-MCNC: 92 MG/DL (ref 65–99)
GLUCOSE UR STRIP.AUTO-MCNC: NEGATIVE MG/DL
HCG SERPL QL: NEGATIVE
HCT VFR BLD AUTO: 41.8 % (ref 37–47)
HGB BLD-MCNC: 15 G/DL (ref 12–16)
HYALINE CASTS #/AREA URNS LPF: ABNORMAL /LPF
IMM GRANULOCYTES # BLD AUTO: 0.01 K/UL (ref 0–0.11)
IMM GRANULOCYTES NFR BLD AUTO: 0.2 % (ref 0–0.9)
KETONES UR STRIP.AUTO-MCNC: NEGATIVE MG/DL
LEUKOCYTE ESTERASE UR QL STRIP.AUTO: ABNORMAL
LYMPHOCYTES # BLD AUTO: 1.68 K/UL (ref 1–4.8)
LYMPHOCYTES NFR BLD: 36.5 % (ref 22–41)
MCH RBC QN AUTO: 30.5 PG (ref 27–33)
MCHC RBC AUTO-ENTMCNC: 35.9 G/DL (ref 32.2–35.5)
MCV RBC AUTO: 85 FL (ref 81.4–97.8)
METHADONE UR QL SCN: NEGATIVE
MICRO URNS: ABNORMAL
MONOCYTES # BLD AUTO: 0.44 K/UL (ref 0–0.85)
MONOCYTES NFR BLD AUTO: 9.6 % (ref 0–13.4)
NEUTROPHILS # BLD AUTO: 2.14 K/UL (ref 1.82–7.42)
NEUTROPHILS NFR BLD: 46.5 % (ref 44–72)
NITRITE UR QL STRIP.AUTO: NEGATIVE
NRBC # BLD AUTO: 0 K/UL
NRBC BLD-RTO: 0 /100 WBC (ref 0–0.2)
OPIATES UR QL SCN: NEGATIVE
OXYCODONE UR QL SCN: NEGATIVE
PCP UR QL SCN: NEGATIVE
PH UR STRIP.AUTO: 8 [PH] (ref 5–8)
PLATELET # BLD AUTO: 326 K/UL (ref 164–446)
PMV BLD AUTO: 9.9 FL (ref 9–12.9)
POTASSIUM SERPL-SCNC: 3.7 MMOL/L (ref 3.6–5.5)
PROPOXYPH UR QL SCN: NEGATIVE
PROT SERPL-MCNC: 6.9 G/DL (ref 6–8.2)
PROT UR QL STRIP: 30 MG/DL
RBC # BLD AUTO: 4.92 M/UL (ref 4.2–5.4)
RBC # URNS HPF: ABNORMAL /HPF
RBC UR QL AUTO: ABNORMAL
SODIUM SERPL-SCNC: 139 MMOL/L (ref 135–145)
SP GR UR STRIP.AUTO: 1.01
UROBILINOGEN UR STRIP.AUTO-MCNC: 0.2 MG/DL
WBC # BLD AUTO: 4.6 K/UL (ref 4.8–10.8)
WBC #/AREA URNS HPF: ABNORMAL /HPF

## 2024-02-20 PROCEDURE — A9270 NON-COVERED ITEM OR SERVICE: HCPCS | Performed by: NURSE PRACTITIONER

## 2024-02-20 PROCEDURE — 95700 EEG CONT REC W/VID EEG TECH: CPT | Performed by: PSYCHIATRY & NEUROLOGY

## 2024-02-20 PROCEDURE — 95716 VEEG EA 12-26HR CONT MNTR: CPT | Performed by: PSYCHIATRY & NEUROLOGY

## 2024-02-20 PROCEDURE — 770020 HCHG ROOM/CARE - TELE (206)

## 2024-02-20 PROCEDURE — 95720 EEG PHY/QHP EA INCR W/VEEG: CPT | Performed by: PSYCHIATRY & NEUROLOGY

## 2024-02-20 PROCEDURE — 80307 DRUG TEST PRSMV CHEM ANLYZR: CPT

## 2024-02-20 PROCEDURE — 700111 HCHG RX REV CODE 636 W/ 250 OVERRIDE (IP): Mod: JZ | Performed by: PSYCHIATRY & NEUROLOGY

## 2024-02-20 PROCEDURE — 81001 URINALYSIS AUTO W/SCOPE: CPT

## 2024-02-20 PROCEDURE — 36415 COLL VENOUS BLD VENIPUNCTURE: CPT

## 2024-02-20 PROCEDURE — 4A10X4Z MONITORING OF CENTRAL NERVOUS ELECTRICAL ACTIVITY, EXTERNAL APPROACH: ICD-10-PCS | Performed by: PSYCHIATRY & NEUROLOGY

## 2024-02-20 PROCEDURE — A9270 NON-COVERED ITEM OR SERVICE: HCPCS | Performed by: PSYCHIATRY & NEUROLOGY

## 2024-02-20 PROCEDURE — 99222 1ST HOSP IP/OBS MODERATE 55: CPT | Mod: 25 | Performed by: PSYCHIATRY & NEUROLOGY

## 2024-02-20 PROCEDURE — 84703 CHORIONIC GONADOTROPIN ASSAY: CPT

## 2024-02-20 PROCEDURE — 700102 HCHG RX REV CODE 250 W/ 637 OVERRIDE(OP): Performed by: PSYCHIATRY & NEUROLOGY

## 2024-02-20 PROCEDURE — 87086 URINE CULTURE/COLONY COUNT: CPT

## 2024-02-20 PROCEDURE — 85025 COMPLETE CBC W/AUTO DIFF WBC: CPT

## 2024-02-20 PROCEDURE — 80235 DRUG ASSAY LACOSAMIDE: CPT | Mod: 91

## 2024-02-20 PROCEDURE — 80053 COMPREHEN METABOLIC PANEL: CPT

## 2024-02-20 PROCEDURE — 700102 HCHG RX REV CODE 250 W/ 637 OVERRIDE(OP): Performed by: NURSE PRACTITIONER

## 2024-02-20 RX ORDER — LACOSAMIDE 50 MG/1
50 TABLET ORAL 2 TIMES DAILY
Status: COMPLETED | OUTPATIENT
Start: 2024-02-20 | End: 2024-02-20

## 2024-02-20 RX ORDER — CLOBAZAM 10 MG/1
5 TABLET ORAL NIGHTLY
Status: DISCONTINUED | OUTPATIENT
Start: 2024-02-20 | End: 2024-02-20

## 2024-02-20 RX ORDER — DIPHENHYDRAMINE HCL 25 MG
25 TABLET ORAL EVERY 8 HOURS PRN
Status: DISCONTINUED | OUTPATIENT
Start: 2024-02-20 | End: 2024-02-23 | Stop reason: HOSPADM

## 2024-02-20 RX ORDER — ENOXAPARIN SODIUM 100 MG/ML
40 INJECTION SUBCUTANEOUS DAILY
Status: DISCONTINUED | OUTPATIENT
Start: 2024-02-20 | End: 2024-02-23 | Stop reason: HOSPADM

## 2024-02-20 RX ORDER — LORAZEPAM 2 MG/ML
1 INJECTION INTRAMUSCULAR
Status: DISCONTINUED | OUTPATIENT
Start: 2024-02-20 | End: 2024-02-23 | Stop reason: HOSPADM

## 2024-02-20 RX ORDER — IBUPROFEN 800 MG/1
400 TABLET ORAL EVERY 6 HOURS PRN
Status: DISCONTINUED | OUTPATIENT
Start: 2024-02-20 | End: 2024-02-23 | Stop reason: HOSPADM

## 2024-02-20 RX ORDER — LORAZEPAM 2 MG/ML
2 INJECTION INTRAMUSCULAR
Status: DISCONTINUED | OUTPATIENT
Start: 2024-02-20 | End: 2024-02-23 | Stop reason: HOSPADM

## 2024-02-20 RX ORDER — NICOTINE 21 MG/24HR
21 PATCH, TRANSDERMAL 24 HOURS TRANSDERMAL
Status: DISCONTINUED | OUTPATIENT
Start: 2024-02-20 | End: 2024-02-23 | Stop reason: HOSPADM

## 2024-02-20 RX ORDER — CLOBAZAM 10 MG/1
5 TABLET ORAL DAILY
Status: COMPLETED | OUTPATIENT
Start: 2024-02-20 | End: 2024-02-20

## 2024-02-20 RX ORDER — LACOSAMIDE 100 MG/1
100 TABLET ORAL 2 TIMES DAILY
Status: DISCONTINUED | OUTPATIENT
Start: 2024-02-20 | End: 2024-02-20

## 2024-02-20 RX ORDER — CLOBAZAM 10 MG/1
10 TABLET ORAL 2 TIMES DAILY
Status: DISCONTINUED | OUTPATIENT
Start: 2024-02-20 | End: 2024-02-21

## 2024-02-20 RX ADMIN — CLOBAZAM 5 MG: 10 TABLET ORAL at 13:26

## 2024-02-20 RX ADMIN — CLOBAZAM 10 MG: 10 TABLET ORAL at 20:08

## 2024-02-20 RX ADMIN — LACOSAMIDE 50 MG: 50 TABLET, FILM COATED ORAL at 17:11

## 2024-02-20 RX ADMIN — LACOSAMIDE 100 MG: 100 TABLET, FILM COATED ORAL at 10:43

## 2024-02-20 RX ADMIN — ENOXAPARIN SODIUM 40 MG: 100 INJECTION SUBCUTANEOUS at 17:11

## 2024-02-20 RX ADMIN — NICOTINE TRANSDERMAL SYSTEM 21 MG: 21 PATCH, EXTENDED RELEASE TRANSDERMAL at 10:44

## 2024-02-20 ASSESSMENT — LIFESTYLE VARIABLES
ON A TYPICAL DAY WHEN YOU DRINK ALCOHOL HOW MANY DRINKS DO YOU HAVE: 0
EVER HAD A DRINK FIRST THING IN THE MORNING TO STEADY YOUR NERVES TO GET RID OF A HANGOVER: NO
ALCOHOL_USE: NO
HOW MANY TIMES IN THE PAST YEAR HAVE YOU HAD 5 OR MORE DRINKS IN A DAY: 0
HAVE YOU EVER FELT YOU SHOULD CUT DOWN ON YOUR DRINKING: NO
TOTAL SCORE: 0
TOTAL SCORE: 0
HAVE PEOPLE ANNOYED YOU BY CRITICIZING YOUR DRINKING: NO
EVER FELT BAD OR GUILTY ABOUT YOUR DRINKING: NO
AVERAGE NUMBER OF DAYS PER WEEK YOU HAVE A DRINK CONTAINING ALCOHOL: 0
CONSUMPTION TOTAL: NEGATIVE
TOTAL SCORE: 0
DOES PATIENT WANT TO STOP DRINKING: CANNOT ASSESS

## 2024-02-20 ASSESSMENT — PATIENT HEALTH QUESTIONNAIRE - PHQ9
2. FEELING DOWN, DEPRESSED, IRRITABLE, OR HOPELESS: SEVERAL DAYS
1. LITTLE INTEREST OR PLEASURE IN DOING THINGS: NEARLY EVERY DAY
9. THOUGHTS THAT YOU WOULD BE BETTER OFF DEAD, OR OF HURTING YOURSELF: NOT AT ALL
5. POOR APPETITE OR OVEREATING: MORE THAN HALF THE DAYS
6. FEELING BAD ABOUT YOURSELF - OR THAT YOU ARE A FAILURE OR HAVE LET YOURSELF OR YOUR FAMILY DOWN: NOT AL ALL
8. MOVING OR SPEAKING SO SLOWLY THAT OTHER PEOPLE COULD HAVE NOTICED. OR THE OPPOSITE, BEING SO FIGETY OR RESTLESS THAT YOU HAVE BEEN MOVING AROUND A LOT MORE THAN USUAL: NOT AT ALL
SUM OF ALL RESPONSES TO PHQ QUESTIONS 1-9: 12
SUM OF ALL RESPONSES TO PHQ9 QUESTIONS 1 AND 2: 4
3. TROUBLE FALLING OR STAYING ASLEEP OR SLEEPING TOO MUCH: MORE THAN HALF THE DAYS
4. FEELING TIRED OR HAVING LITTLE ENERGY: MORE THAN HALF THE DAYS
7. TROUBLE CONCENTRATING ON THINGS, SUCH AS READING THE NEWSPAPER OR WATCHING TELEVISION: MORE THAN HALF THE DAYS

## 2024-02-20 ASSESSMENT — COGNITIVE AND FUNCTIONAL STATUS - GENERAL
DAILY ACTIVITIY SCORE: 24
SUGGESTED CMS G CODE MODIFIER MOBILITY: CJ
SUGGESTED CMS G CODE MODIFIER DAILY ACTIVITY: CH
MOBILITY SCORE: 22
CLIMB 3 TO 5 STEPS WITH RAILING: A LOT

## 2024-02-20 ASSESSMENT — PAIN DESCRIPTION - PAIN TYPE
TYPE: ACUTE PAIN
TYPE: ACUTE PAIN

## 2024-02-20 ASSESSMENT — VISUAL ACUITY: VA_NORMAL: 1

## 2024-02-20 NOTE — CARE PLAN
The patient is Stable - Low risk of patient condition declining or worsening    Shift Goals  Clinical Goals: safety; neuro stability  Patient Goals: catch seizure  Family Goals: CARTER    Progress made toward(s) clinical / shift goals:  Pt. Is A&O x 4. Follows commands and responds appropriately. Seizure precautions, EEG in place. Will continue to round hourly and encourage the Pt. To ask questions and voice feelings.          Patient is not progressing towards the following goals:  N/a
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
## 2024-02-20 NOTE — EEG PROGRESS NOTE
Patient placed on cEEG with CT compatible electrodes only.     Marcos PIERSONEEG T  Neurodiagnostics Specialist
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
## 2024-02-20 NOTE — PROCEDURES
[facility]    Continuous Video Electroencephalogram Report      Patient Name: Kim Brice  MRN: [id number]  #: [id number]  Date of Service: 07:27 on 2/20/2024 to 06:59 on 02/21/24.  Total Recording Time: 22 hours and 52 minutes.  Referring Provider: Brando Curiel M.D.    INDICATION:  Kim Brice 28 y.o. female presenting with seizure(s)    CURRENT ANTI-SEIZURE AND OTHER PERTINENT MEDICATIONS:     Current Facility-Administered Medications:     enoxaparin (LOVENOX) injection    ibuprofen    LORazepam **OR** LORazepam    diphenhydrAMINE    nicotine **AND** Nicotine Replacement Patient Education Materials **AND** nicotine polacrilex    clobazam    TECHNIQUE: CVEEG was set up by a Neurodiagnostic technologist who performed education to the patient and staff. A minimum of 23 electrodes and 23 channel recording was setup and performed by Neurodiagnostic technologist, in accordance with the international 10-20 system. Impedence, electrode integrity, and technical impressions were documented a minimum of every 2-24 hour period by a Neurodiagnostic Technologist and reviewed by Interpreting Physician. The study was reviewed in bipolar and referential montages. The recording examined the patient in the awake, drowsy, and sleep state(s).     DESCRIPTION OF THE RECORD:  EEG background: During maximal wakefulness, the background was continuous, symmetrical, and 12 Hz posterior dominant rhythm.  Reactivity and state changes were present.  During drowsiness, a loss of myogenic artifact and theta/delta frequencies were seen.     Sleep was captured and was characterized by diffuse background delta/theta activity with a loss of myogenic artifact.  N2 sleep transients in the form of sleep spindles and vertex waves were seen in the leads over the central regions.     ACTIVATION PROCEDURES:   Hyperventilation was performed by the patient for a total of 3 minutes. The technician performing the test noted good effort. This  technique did not elicited any abnormalities on EEG.  and Intermittent Photic stimulation was performed in a stepwise fashion from 1 to 30 Hz and did not elicited any abnormalities on EEG. The patient was sleep deprived as well.     ICTAL AND INTERICTAL FINDINGS:   No definite focal or generalized epileptiform activity noted.     No definite persistent regional slowing was seen during this routine study.      No electroclinical or electrographic seizures were reported or recorded during the study.     EKG: Sampling of the EKG recording did not demonstrate any abnormalities    EVENTS:  No clinical events recorded or reported    INTERPRETATION:  Normal video EEG recording in the awake, drowsy, and sleep state(s):  No definite focal nor generalized slowing noted.  Epileptiform discharges: No definitive epileptiform discharges or other epileptiform phenomena seen.   No definitive seizures.     Brando Curiel MD  Neurology Attending, Epilepsy Program  [facility]

## 2024-02-20 NOTE — PROGRESS NOTES
4 Eyes Skin Assessment Completed by Hugo RN and KAYLA Taylor.    Head WDL  Ears WDL  Nose WDL  Mouth WDL  Neck WDL  Breast/Chest Scab and Scar  Shoulder Blades WDL  Spine WDL  (R) Arm/Elbow/Hand Scab and Scar  (L) Arm/Elbow/Hand Scab and Scar  Abdomen Scar and Scab  Groin Scar and Scab  Scrotum/Coccyx/Buttocks Scar and Scab  (R) Leg Scar and Scab  (L) Leg Scar and Scab  (R) Heel/Foot/Toe Scar and Scab  (L) Heel/Foot/Toe Scar and Scab          Devices In Places Tele Box, Blood Pressure Cuff, and Pulse Ox      Interventions In Place N/A    Possible Skin Injury No    Pictures Uploaded Into Epic N/A  Wound Consult Placed N/A  RN Wound Prevention Protocol Ordered No

## 2024-02-21 PROCEDURE — 95716 VEEG EA 12-26HR CONT MNTR: CPT | Performed by: PSYCHIATRY & NEUROLOGY

## 2024-02-21 PROCEDURE — 770020 HCHG ROOM/CARE - TELE (206)

## 2024-02-21 PROCEDURE — 95720 EEG PHY/QHP EA INCR W/VEEG: CPT | Performed by: PSYCHIATRY & NEUROLOGY

## 2024-02-21 PROCEDURE — A9270 NON-COVERED ITEM OR SERVICE: HCPCS | Performed by: NURSE PRACTITIONER

## 2024-02-21 PROCEDURE — 700111 HCHG RX REV CODE 636 W/ 250 OVERRIDE (IP): Mod: JZ | Performed by: PSYCHIATRY & NEUROLOGY

## 2024-02-21 PROCEDURE — 700102 HCHG RX REV CODE 250 W/ 637 OVERRIDE(OP): Performed by: NURSE PRACTITIONER

## 2024-02-21 PROCEDURE — 99233 SBSQ HOSP IP/OBS HIGH 50: CPT | Mod: 25 | Performed by: NURSE PRACTITIONER

## 2024-02-21 RX ORDER — CLOBAZAM 10 MG/1
20 TABLET ORAL
Status: DISCONTINUED | OUTPATIENT
Start: 2024-02-21 | End: 2024-02-23 | Stop reason: HOSPADM

## 2024-02-21 RX ORDER — CLOBAZAM 10 MG/1
10 TABLET ORAL EVERY MORNING
Status: DISCONTINUED | OUTPATIENT
Start: 2024-02-22 | End: 2024-02-23 | Stop reason: HOSPADM

## 2024-02-21 RX ADMIN — CLOBAZAM 20 MG: 10 TABLET ORAL at 20:17

## 2024-02-21 RX ADMIN — ENOXAPARIN SODIUM 40 MG: 100 INJECTION SUBCUTANEOUS at 17:28

## 2024-02-21 RX ADMIN — CLOBAZAM 10 MG: 10 TABLET ORAL at 04:17

## 2024-02-21 ASSESSMENT — VISUAL ACUITY: VA_NORMAL: 1

## 2024-02-21 ASSESSMENT — FIBROSIS 4 INDEX: FIB4 SCORE: 0.47

## 2024-02-21 ASSESSMENT — PAIN DESCRIPTION - PAIN TYPE: TYPE: ACUTE PAIN

## 2024-02-21 NOTE — PROGRESS NOTES
" EMU DAILY PROGRESS NOTE-        ID: This is a 28 y.o. female who presents to the EMU for evaluation.    INTERVAL HISTORY:    Significant other and room-mate at bedside today.    EVENTS: No concern for seizures specifically.  Has not pushed the button.    Reports feeling \"day dreamy\" and not feeling \"all in there\" which can be a pre-cursor to a seizure.  Reports that she has moments of an increase in her heart beat and a feeling in her chest, mild upwelling.  She thinks that this is anxiety related and is not as intense as it once was prior to starting an anti-seizure medication.    Day 1-2 cEEG monitoring per Dr Curiel:    EVENTS:  No clinical events recorded or reported     INTERPRETATION:  Normal video EEG recording in the awake, drowsy, and sleep state(s):  No definite focal nor generalized slowing noted.  Epileptiform discharges: No definitive epileptiform discharges or other epileptiform phenomena seen.   No definitive seizures.       Medical Condition:  Urine + cannabinoid and benzo  Voiding well since yesterday.    PAIN ASSESSMENT PAST 24 HOURS:  Pain Assessment for the past 24 hrs:   Pain Rating Scale (NPRS) Intervention   02/21/24 0839 0 Declines   02/20/24 2000 0 Declines        Labs reviews this visit - Notably abnormal were the following:  Abnormal Labs Reviewed   CBC WITH DIFFERENTIAL - Abnormal; Notable for the following components:       Result Value    WBC 4.6 (*)     MCHC 35.9 (*)     All other components within normal limits   COMP METABOLIC PANEL - Abnormal; Notable for the following components:    Co2 18 (*)     Correct Calcium 8.4 (*)     All other components within normal limits   URINE DRUG SCREEN - Abnormal; Notable for the following components:    Benzodiazepines Positive (*)     Cannabinoid Metab Positive (*)     All other components within normal limits   URINALYSIS - Abnormal; Notable for the following components:    Color Orange (*)     Protein 30 (*)     Leukocyte Esterase Moderate " (*)     Occult Blood Large (*)     All other components within normal limits    Narrative:     Collected By: [id number] MICHEAL RIVAS  Indication for culture:->Patient WITHOUT an indwelling Little  catheter in place with new onset of Dysuria, Frequency,  Urgency, and/or Suprapubic pain   URINE MICROSCOPIC (W/UA) - Abnormal; Notable for the following components:    WBC 20-50 (*)     RBC 20-50 (*)     Bacteria Few (*)     All other components within normal limits    Narrative:     Collected By: [id number] MICHEAL RIVAS  Indication for culture:->Patient WITHOUT an indwelling Little  catheter in place with new onset of Dysuria, Frequency,  Urgency, and/or Suprapubic pain        CURRENT MEDICATIONS AT THE TIME OF THIS ENCOUNTER:  Scheduled Medications   Medication Dose Frequency    enoxaparin (LOVENOX) injection  40 mg DAILY AT 1800    nicotine  21 mg Daily-0600    clobazam  10 mg BID     ibuprofen, 400 mg, Q6HRS PRN  LORazepam, 1 mg, Once PRN   Or  LORazepam, 2 mg, Once PRN  diphenhydrAMINE, 25 mg, Q8HRS PRN  nicotine polacrilex, 2 mg, Q HOUR PRN        EXAM:   Patient Vitals for the past 24 hrs:   BP Temp Temp src Pulse Resp SpO2 Weight   02/21/24 0839 104/68 36 °C (96.8 °F) Temporal 67 16 97 % --   02/21/24 0412 103/69 36.8 °C (98.2 °F) Temporal 66 16 95 % 41.4 kg (91 lb 4.3 oz)   02/20/24 1919 112/77 36.7 °C (98.1 °F) Temporal 83 17 96 % --   02/20/24 1528 113/77 35.9 °C (96.6 °F) Temporal 81 18 97 % --   02/20/24 1156 114/79 36 °C (96.8 °F) Temporal 79 16 99 % --        .Temp:  [35.9 °C (96.6 °F)-36.8 °C (98.2 °F)] 36 °C (96.8 °F)  Pulse:  [66-83] 67  Resp:  [16-18] 16  BP: (103-114)/(68-79) 104/68  SpO2:  [95 %-99 %] 97 %     Physical Exam:  Physical Exam  Constitutional:       Appearance: She is well-developed.      Comments: Slight framed   HENT:      Head: Normocephalic and atraumatic.      Nose: Congestion (rhinnorhea) present.   Eyes:      General: Lids are normal.      Extraocular Movements: Extraocular  movements intact.      Pupils: Pupils are equal, round, and reactive to light.      Comments: Wears glasses   Cardiovascular:      Rate and Rhythm: Normal rate and regular rhythm.   Pulmonary:      Effort: Pulmonary effort is normal.   Musculoskeletal:         General: Normal range of motion.      Cervical back: Normal range of motion.   Skin:     General: Skin is warm.      Coloration: Skin is pale.      Comments: Picking disorder   Neurological:      Mental Status: She is oriented to person, place, and time.      Motor: No abnormal muscle tone.      Gait: Gait normal.      Deep Tendon Reflexes:      Reflex Scores:       Bicep reflexes are 1+ on the right side and 1+ on the left side.       Brachioradialis reflexes are 1+ on the right side and 1+ on the left side.       Patellar reflexes are 2+ on the right side and 2+ on the left side.       Achilles reflexes are 2+ on the right side and 2+ on the left side.     Comments: No observable changes in neurologic status.  See initial new patient examination for details.    Psychiatric:         Mood and Affect: Mood normal.         Speech: Speech normal.      Comments: +anxiety, tension        Neurological Exam   Neurological Exam  Mental Status  Awake, alert and oriented to person, place and time. Oriented to person, place, and time. Recent and remote memory are intact. Speech is normal. Language is fluent with no aphasia. Attention and concentration are normal. Fund of knowledge is appropriate for level of education.    Cranial Nerves  CN II: Vision test: Wears glasses. Visual acuity is normal. Visual fields full to confrontation. Wears glasses full-time.  CN III, IV, VI: Extraocular movements intact bilaterally. Normal lids and orbits bilaterally. Pupils equal round and reactive to light bilaterally.  CN V: Facial sensation is normal.  CN VII: Full and symmetric facial movement.  CN VIII: Hearing is normal.  CN IX, X: Palate elevates symmetrically. Normal gag  reflex.  CN XI: Shoulder shrug strength is normal.  CN XII: Tongue midline without atrophy or fasciculations.    Motor  Normal muscle bulk throughout. Decreased muscle tone. No abnormal involuntary movements.    Sensory  Sensation is intact to light touch, pinprick, vibration and proprioception in all four extremities.  Subjective numbness in bilateral lower extremities if she positions herself sitting directly on pelvic girdle..    Reflexes                                            Right                      Left  Brachioradialis                    1+                         1+  Biceps                                 1+                         1+  Patellar                                2+                         2+  Achilles                                2+                         2+    Coordination  Right: Finger-to-nose normal. Rapid alternating movement normal. Heel-to-shin normal.Left: Finger-to-nose normal. Rapid alternating movement normal. Heel-to-shin normal.    Gait  Normal casual, toe, heel and tandem gait. Normal gait.  No observable changes in neurologic status.  See initial new patient examination for details. .       I/O Current Shift       I/O Past 3 Shifts:  I/O last 3 completed shifts:  In: 240 [P.O.:240]  Out: -      I/O NET Since Admission  Net IO Since Admission: 240 mL [02/21/24 1142]     BOWEL OUTPUT LAST 7 DAYS  Bowel Output for the past 168 hrs:   Last BM   02/20/24 0800 02/19/24           ASSESSMENT, EDUCATION, AND/OR COUNSELING  This is a 28 y.o.  female who presents to the EMU to characterize and localize epileptic activity, identify the degree of epileptic burden, if any, evaluate for unrecognized seizures, and to optimize medications.     I had an extensive discussion with the patient about the purpose of the admission. Discussed the purpose of provocative maneuvers such as photic stimulation, hyperventilation, and/or sleep deprivation which may unmask epileptic activity. We  discussed that it may be necessary to decrease, or discontinue altogether, any and all anti-seizure medications to achieve the goals of the admission. I explained that this places patient at higher risk for seizures and status epilepticus, a serious life-threatening emergency with the potential for serious injury or death. It is therefore critical to the patient's safety that he/she monitored in the epilepsy monitoring unit for multiple days to mitigate the risk for serious injury or death. Discussed that the EMU and its personnel mitigate risk as there are rescue medications on hand if needed for any prolonged or repetitive seizures; trained nursing staff, EEG technicians, and a board certified epileptologist available 24/7; and continuous EEG and video surveillance being monitoring live by trained personnel at all times.       Discussed the importance of maintaining seizure precautions at all times. Discussed the importance of notifying nursing staff by using the call button for any concerns or needs, especially to assist with ambulation or transtions into and out of bed. Emphasized that the patient is at a higher for falling and potential subsequent injury due to the cumbersome equipment being worn, as well as other potential factors that may impair balance. It is therefore critical that the patient refrain from getting out of bed or walking without staff assistance.      Discussed the importance and rationale for DVT prophylaxis as well.      Finally, counseled the patient on using the push-button should she/he have any events concerning for seizure. Encourage visitors to press the button as well if a seizure or aura is witnessed and the patient is unable to press the button herself/himself.     Patient agreed to the above discussion. All questions/concerns were addressed.    PLAN:    The patient started having staring spells in childhood, as noted under event type #1. She had one [facility] seizure in 2016 in context  "of Benadryl overdose. Her next [facility] event was in 2023, and around that time she started having focal impaired seizures as well (event type #2).      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      She has significant mood issues with Vimpat, though these got better. She was not able to tolerate zonisamide. Bi-weekly telemed appointments with therapist and diagnosis of adult autism, ADHD, dysthymia and OCD with explosive anger.    Will continue to optimize anti-seizure medication for enhancing mood and quality of life.      Events in questions to capture      Event type #1: \"Staring spells\".  Year/Age of Onset: Childhood.  Initial features: Anxious and \"looping through her thoughts\" and she experience teja vu of a smell from her childhood. Lately she is getting strange taste (unpleasant taste). Sensation of butterflies in her stomach. She has dissociation sensation with these episodes.   Event features: No confusion nor loss of awareness. She stares ahead.  Post-ictal features: Feels a bit confused.   Duration: Seconds for smell/taste, but \"looping through her thoughts\" can go for hours.  Frequency: Almost daily.  Precipitating factors: Stress.     Event type #2: \"Focal seizures\".  Year/Age of Onset: June 2023  Initial features: Gets a \"weird\" feeling, teja vu, nauseous, intense sense of fear. At times weird taste and smell. Salivation.  Event features: Confused, not fully aware of her surroundings. Loses time. Oral automatisms.   Post-ictal features: Feels very tired.   Duration: 30-60 seconds.  Frequency: Several times per week.  Precipitating factors: Not clear.      Event type #3: \"Bilateral tonic-clonic seizure\".  Year/Age of Onset: She had seizure/status in 2016, and then the next event was in July 2023.   Initial features: Features of event type #1.   Event features: Screams. Whole body tensing. Not clear if any lateralization. No tongue bite nor bladder/bowel " incontinence. She has no recollection for the time during the event. She was not responsive during the event.   Post-ictal features: Tired, sore, and confused.   Duration: 1-2 minutes.   Frequency: One event in 2016, and one event on 07/01/2023.   Precipitating factors: Not clear.         Continuous VEEG monitoring  Vitals and neurological checks as ordered  Telemetry  Routine Admission labs: CBC, CMP, antiseizure drug levels: REVIEWED  Other diagnostics if applicable: NA  HV and PS to be performed on Day 1 of admission and at the discretion of the attending epileptologist on subsequent days  Rescue Ativan Protocol ordered  Ativan PIV prn  Sleep deprivation: YES  Active antiseizure regimen:  DAY 1, 2/20/2024: Lacosamide 100mg BID -- wean to 50mg tonight then stop. Clobazam 5mg QHS-- give 5mg now (by 1500)  then increase to 10mg BID starting tonight at 2100.    DAY 2: Lacosamide 100mg BID HELD since last dose PM 2/20/2024.  Continue clobazam 10mg BID    DISCHARGE ITEMS:  - Obtain Brain MRI -- will assist patient to schedule ASAP.  Phone number given to significant other to scheduled via telephone today.     - VNS candidate (if pharmaco-resistant epilepsy determined)  - Consider Keppra retrial or TPM?  She would like to review all possibilities for ASM's before consideration of surgical evaluation or even the VNS implant.  This is verbalized after significant eduction.    - Family Planning-- desires to have children in the future    OTHER ITEMS:  DVT Ppx: Lovenox and/or SCDs  DIET: REGULAR  Bowel regimen  PRN analgesics available for pain  PRN antiemetics available    CODE STATUS:   - FULL CODE      BILLING DOCUMENTATION:     I spent a total of 35+ minutes of face-to-face time in this visit. Over 50% of the time of the visit today was spent on counseling and/or coordination of care wtih the patient and/or family, as above in assessment in plan. This does not include time spent on separately billable procedures/tests.      Marisela Clements, MSN, APRN, FNP-C  Department of Neurology at [facility]  APRN of [facility] Level III Comprehensive Epilepsy Program   of Clinical Neurology, [facility]  Number: [phone]  Fax: [phone]  E-mail: [email or web address]

## 2024-02-21 NOTE — CARE PLAN
Problem: Knowledge Deficit - Standard  Goal: Patient and family/care givers will demonstrate understanding of plan of care, disease process/condition, diagnostic tests and medications  Outcome: Progressing     Problem: Provide Safe Environment  Goal: Suicide environmental safety, protocols, policies, and practices will be implemented  Outcome: Progressing     Problem: Psychosocial  Goal: Patient's ability to identify and develop effective coping behaviors will improve  Outcome: Progressing  Goal: Patient's ability to identify and utilize available support systems will improve  Outcome: Progressing     Problem: Seizure Precautions  Goal: Implementation of seizure precautions  Outcome: Progressing     Problem: Seizure EEG Monitoring  Goal: Appropriate Monitoring of EEG patient  Outcome: Met     Problem: Physical Regulation  Goal: Decrease in complications related to the disease process, condition or treatment  Outcome: Progressing     Problem: Medication  Goal: Risk for seizure medication side effects will decrease  Outcome: Progressing     Problem: Knowledge Deficit - Seizures  Goal: Knowledge of seizure treatment regimen will improve  Outcome: Progressing   The patient is Stable - Low risk of patient condition declining or worsening    Shift Goals  Clinical Goals: veeg  Patient Goals: capture seizure  Family Goals: triny    Progress made toward(s) clinical / shift goals:  veeg in place.     Patient is not progressing towards the following goals:

## 2024-02-21 NOTE — CARE PLAN
The patient is Stable - Low risk of patient condition declining or worsening    Shift Goals  Clinical Goals: cVEEG/Sleep Deprivation  Patient Goals: Capture a Seizure  Family Goals: CARTER    Progress made toward(s) clinical / shift goals: Assumed care of patient at 1915. Patient is A&Ox4, Q4hr neuro checks are being completed. Continuous VEEG in place with seizure/aspiration precautions. Sleep deprivation in place, patient able to sleep from [id number]. Bed is low and locked, call light is within reach, hourly rounding continues.     Problem: Seizure Precautions  Goal: Implementation of seizure precautions  Outcome: Progressing  Note: The following are all in place:   1.  Padded side rails up at all times  2.  Suction equipment and oxygen delivery system at bedside  3.  Continuous pulse oximeter in use  4.  Implement fall precautions, bed alarm on, bed in lowest position  5.  IV access (per order)  6.  Provide low stimulus environment, avoid exposure to triggers  7.  Instruct patient to use call light/seizure button if having warning signs of impending seizure     Problem: Seizure EEG Monitoring  Goal: Appropriate Monitoring of EEG patient  Outcome: Progressing  Flowsheets (Taken 2/21/2024 0239)  EEG Monitoring:   Verifed patient in view of camera at all times   Attended patient while in bathroom   Continuous video monitoring by sitter   Reviewed remote cardiac monitoring orders   Recorded and documented events     Patient is not progressing towards the following goals: N/A

## 2024-02-21 NOTE — PROGRESS NOTES
Patient scored LOW RISK on Umana-Maynor Fall Risk Assessment. Until 0124, patient had frame alarm on, while refusing bed alarm. However at this time, patient is now refusing both frame and bed alarms. Education provided on level of risk, seizure precautions, and to call when needing to get out of bed. Patient verbalizes understanding. This Primary RN is also Charge RN, co-sign not needed.

## 2024-02-21 NOTE — PROCEDURES
Critical access hospital    Continuous Video Electroencephalogram Report      Patient Name: Kim Brice  MRN: [id number]  #: [id number]  Date of Service: 07:01 on 2/21/2024 to 07:00 on 02/22/24.  Total Recording Time: 23 hours and 42 minutes.  Referring Provider: Brando Curiel M.D.    INDICATION:  Kim Brice 28 y.o. female presenting with seizure(s)    CURRENT ANTI-SEIZURE AND OTHER PERTINENT MEDICATIONS:     Current Facility-Administered Medications:     clobazam    clobazam    enoxaparin (LOVENOX) injection    ibuprofen    LORazepam **OR** LORazepam    diphenhydrAMINE    nicotine **AND** Nicotine Replacement Patient Education Materials **AND** nicotine polacrilex    TECHNIQUE: CVEEG was set up by a Neurodiagnostic technologist who performed education to the patient and staff. A minimum of 23 electrodes and 23 channel recording was setup and performed by Neurodiagnostic technologist, in accordance with the international 10-20 system. Impedence, electrode integrity, and technical impressions were documented a minimum of every 2-24 hour period by a Neurodiagnostic Technologist and reviewed by Interpreting Physician. The study was reviewed in bipolar and referential montages. The recording examined the patient in the awake, drowsy, and sleep state(s).     DESCRIPTION OF THE RECORD:  EEG background: During maximal wakefulness, the background was continuous, intermittently symmetrical, and 12 Hz posterior dominant rhythm.  Reactivity and state changes were present.  During drowsiness, a loss of myogenic artifact and theta/delta frequencies were seen.     Sleep was captured and was characterized by diffuse background delta/theta activity with a loss of myogenic artifact.  N2 sleep transients in the form of sleep spindles and vertex waves were seen in the leads over the central regions.     ACTIVATION PROCEDURES:   Hyperventilation was performed by the patient for a total of 3 minutes. The technician performing the test  noted good effort. This technique did not elicited any abnormalities on EEG.  and Intermittent Photic stimulation was performed in a stepwise fashion from 1 to 30 Hz and did not elicited any abnormalities on EEG. The patient was sleep deprived as well.     ICTAL AND INTERICTAL FINDINGS:   No definite focal or generalized epileptiform activity noted.     There was intermittent, left temporal, focal slowing.     No electroclinical or electrographic seizures were reported or recorded during the study.     EKG: Sampling of the EKG recording did not demonstrate any abnormalities    EVENTS:  No clinical events recorded or reported    INTERPRETATION:  This was an abnormal video EEG recording in the awake, drowsy, and sleep state(s):  The presence of intermittent, left temporal, focal slowing, is suggestive of cerebral dysfunction in that region. This finding might be see in context of structural lesion and/or ictal onset zone, among other considerations.   Epileptiform discharges: No definitive epileptiform discharges or other epileptiform phenomena seen.   No definitive seizures.     Brando Curiel MD  Neurology Attending, Epilepsy Program  [facility]

## 2024-02-21 NOTE — PROGRESS NOTES
1500: S/w with nurse Ivet regarding urine retention despite typical methods to urinate.  Bladder scan of 300+.      PRN urine cath ordered.  Urinalysis and culture ordered.    Pt able to urinate and sample sent per nurse.

## 2024-02-21 NOTE — DIETARY
"Nutrition services: Day 1 of admit.  Kim Brice is a 28 y.o. female with admitting DX of seizures.   Consult received for low BMI.     RD able to visit pt and family at bedside. Pt reports eating <50% for \"months\" now. Reports her appetite has decreased with newly added medication. Pt does report smoking marijuana at home which helps stimulate appetite. Appetite has decreased since being admitted an unable to smoke. Although appetite is poor, pt reports trying to gain weight by eating whatever sounds good. Reports drinking hot chocolate and eating chicken products. RD reviewed high protein foods with patient to aid in weight gain. Pt declined all snacks and supplements offered at this time. Unable to assess temporals d/t neuro monitors. Moderate muscle wasting noted in the clavicle, acromion and interosseous region. Pt reports UBW of 105-115 lbs. Per chart review pt weight has been trending up since Nov 2023. No weight loss criteria met at this time.     Assessment:  Weight: 41.4 kg (91 lb 4.3 oz)  Body mass index is 18.43 kg/m²., BMI classification: Underweight.   Diet/Intake: Regular    Wt Readings from Last 7 Encounters:   02/21/24 41.4 kg (91 lb 4.3 oz)   01/03/24 39.5 kg (87 lb 1.3 oz)   11/08/23 39.4 kg (86 lb 13.8 oz)   09/26/23 41.5 kg (91 lb 7.9 oz)   08/23/23 44 kg (97 lb)   07/27/23 44.9 kg (98 lb 15.8 oz)   07/06/23 45.4 kg (100 lb)     Evaluation:   Labs: WNL  Meds reviewed.  +BM PTA    Malnutrition Risk: Per ASPEN guidelines, pt meets criteria for severe malnutrition in the context of acute illness related to seizure activity/medication changes as evidence by pt reports of eating <50% of normal and moderate muscle wasting noted in multiple regions.     Recommendations/Plan:  Pt may benefit from appetite stimulate, notified MD.   Encourage intake of >50%.   Document intake of all PO as % taken in ADL's to provide interdisciplinary communication across all shifts.   Monitor weight.  Nutrition rep " will continue to see patient for ongoing meal and snack preferences.     RD following.

## 2024-02-21 NOTE — PROGRESS NOTES
1600: S/w nurse Ivet regarding moderate Clarke Suicide Scale scoring. Pt has history of past suicide attempt and with current dysthymia.  Denies thoughts of hurting herself or someone else.  No further action to be taken at this time.

## 2024-02-21 NOTE — PROGRESS NOTES
The patient scored moderate on the [place] Suicide scale, which resulted in a legal hold order set popping up in Epic. I notified the provider who agrees that the patient is not at a current risk for SI. The order set has been dismissed.

## 2024-02-22 LAB
BACTERIA UR CULT: NORMAL
SIGNIFICANT IND 70042: NORMAL
SITE SITE: NORMAL
SOURCE SOURCE: NORMAL

## 2024-02-22 PROCEDURE — A9270 NON-COVERED ITEM OR SERVICE: HCPCS | Performed by: NURSE PRACTITIONER

## 2024-02-22 PROCEDURE — 700102 HCHG RX REV CODE 250 W/ 637 OVERRIDE(OP): Performed by: NURSE PRACTITIONER

## 2024-02-22 PROCEDURE — 99233 SBSQ HOSP IP/OBS HIGH 50: CPT | Mod: 25 | Performed by: NURSE PRACTITIONER

## 2024-02-22 PROCEDURE — 95720 EEG PHY/QHP EA INCR W/VEEG: CPT | Performed by: PSYCHIATRY & NEUROLOGY

## 2024-02-22 PROCEDURE — 770020 HCHG ROOM/CARE - TELE (206)

## 2024-02-22 PROCEDURE — 95713 VEEG 2-12 HR CONT MNTR: CPT

## 2024-02-22 PROCEDURE — 700111 HCHG RX REV CODE 636 W/ 250 OVERRIDE (IP): Mod: JZ | Performed by: PSYCHIATRY & NEUROLOGY

## 2024-02-22 RX ADMIN — CLOBAZAM 10 MG: 10 TABLET ORAL at 05:06

## 2024-02-22 RX ADMIN — ENOXAPARIN SODIUM 40 MG: 100 INJECTION SUBCUTANEOUS at 17:23

## 2024-02-22 RX ADMIN — CLOBAZAM 20 MG: 10 TABLET ORAL at 19:29

## 2024-02-22 ASSESSMENT — PAIN DESCRIPTION - PAIN TYPE: TYPE: ACUTE PAIN

## 2024-02-22 ASSESSMENT — FIBROSIS 4 INDEX: FIB4 SCORE: 0.47

## 2024-02-22 ASSESSMENT — VISUAL ACUITY: VA_NORMAL: 1

## 2024-02-22 NOTE — CARE PLAN
The patient is Stable - Low risk of patient condition declining or worsening    Shift Goals  Clinical Goals: cVEEG/Sleep Deprivation  Patient Goals: Capture Seizure Activity  Family Goals: CARTER    Progress made toward(s) clinical / shift goals: Assumed care of patient at 1915. Patient is A&Ox4, Q4hr neuro checks are being completed. Continuous VEEG in place with fall/seizure/aspiration precautions. Sleep deprivation in place, patient able to sleep from 9861-4158. Bed is low and locked, bed alarm is on, call light is within reach, hourly rounding continues.     Problem: Seizure Precautions  Goal: Implementation of seizure precautions  Outcome: Progressing  Note: The following are all in place:   1.  Padded side rails up at all times  2.  Suction equipment and oxygen delivery system at bedside  3.  Continuous pulse oximeter in use  4.  Implement fall precautions, bed alarm on, bed in lowest position  5.  IV access (per order)  6.  Provide low stimulus environment, avoid exposure to triggers  7.  Instruct patient to use call light/seizure button if having warning signs of impending seizure     Problem: Knowledge Deficit - Seizures  Goal: Knowledge of seizure treatment regimen will improve  Outcome: Progressing  Note: 1.  Educate patient and family/caregiver of importance of seizure precautions in hospital  2.  Educate patient and family/caregiver to report auras and triggers prior to events/ EMU event reporting  3.  Review anticonvulsant medication regimen and side effects     Patient is not progressing towards the following goals: N/A

## 2024-02-22 NOTE — PROGRESS NOTES
1423- Natus alarm pressed by patient. States she woke up and felt like she had a bad dream but can't remember. States she feels weird, confused, whole boy numb/tingly, heart racing. /76, HR 84, 98.2F, RR 16. Aox4. NI 5/5. Following commands, answering questions appropriately. Marisela PAN notified.

## 2024-02-22 NOTE — PROGRESS NOTES
Patient scored LOW RISK on [place] Fall Risk Assessment. Patient is refusing bed alarm and frame alarm. Education provided on the level of risk, seizure precautions, and to call when needing to get out of bed. Patient verbalizes understanding. Charge RN notified.

## 2024-02-22 NOTE — PROCEDURES
[facility]    Continuous Video Electroencephalogram Report      Patient Name: Kim Brice  MRN: [id number]  #: [id number]  Date of Service: 07:01 on 2/22/2024 to 07:00 on 02/23/24.  Total Recording Time: 23 hours and 58 minutes.  Referring Provider: Brando Curiel M.D.    INDICATION:  Kim Brice 28 y.o. female presenting with seizure(s).    CURRENT ANTI-SEIZURE AND OTHER PERTINENT MEDICATIONS:     Current Facility-Administered Medications:     clobazam    clobazam    enoxaparin (LOVENOX) injection    ibuprofen    LORazepam **OR** LORazepam    diphenhydrAMINE    nicotine **AND** Nicotine Replacement Patient Education Materials **AND** nicotine polacrilex    TECHNIQUE: CVEEG was set up by a Neurodiagnostic technologist who performed education to the patient and staff. A minimum of 23 electrodes and 23 channel recording was setup and performed by Neurodiagnostic technologist, in accordance with the international 10-20 system. Impedence, electrode integrity, and technical impressions were documented a minimum of every 2-24 hour period by a Neurodiagnostic Technologist and reviewed by Interpreting Physician. The study was reviewed in bipolar and referential montages. The recording examined the patient in the awake, drowsy, and sleep state(s).     DESCRIPTION OF THE RECORD:  EEG background: During maximal wakefulness, the background was continuous, intermittently asymmetrical, and 12 Hz posterior dominant rhythm.  Reactivity and state changes were present.  During drowsiness, a loss of myogenic artifact and theta/delta frequencies were seen.     Sleep was captured and was characterized by diffuse background delta/theta activity with a loss of myogenic artifact.  N2 sleep transients in the form of sleep spindles and vertex waves were seen in the leads over the central regions.     ACTIVATION PROCEDURES:   None.    ICTAL AND INTERICTAL FINDINGS:   There were very rare, left temporal, sharp  "discharges.        There was intermittent, independent, bi-temporal, focal slowing, of alternating predominance.     No electroclinical or electrographic seizures were reported or recorded during the study.     EKG: Sampling of the EKG recording did not demonstrate any abnormalities    EVENTS:  The patient reported an event at 14:23: \"Natus alarm pressed by patient. States she woke up and felt like she had a bad dream but can't remember. States she feels weird, confused, whole boy numb/tingly, heart racing. /76, HR 84, 98.2F, RR 16. Aox4. NI 5/5. Following commands, answering questions appropriately. Marisela OROURKEN notified.\" Her EEG remained normal throughout the event, and at times clear, normal PDR was noted.      INTERPRETATION:  This was an abnormal video EEG recording in the awake, drowsy, and sleep state(s):  The presence of intermittent, independent, bi-temporal, focal slowing, of alternating predominance, is suggestive of cerebral dysfunction in those region. This finding might be see in context of structural lesion and/or ictal onset zone, among other considerations. Clinical and radiological correlate is recommended.   The presence of very rare, left temporal sharp discharges, is suggestive of increased propensity toward focal seizures arising from that region.   No definitive seizures.   The patient reported an event at 14:23 on 02/22/2024, but no ictal/interictal EEG correlate was noted. This might represent a non-epileptic event versus a very focal seizure that was not captured on scalp EEG.    Brando Curiel MD  Neurology Attending, Epilepsy Program  [facility]      "

## 2024-02-22 NOTE — PROGRESS NOTES
EMU DAILY PROGRESS NOTE-        ID: This is a 28 y.o. female who presents to the EMU for evaluation.    INTERVAL HISTORY:    EVENTS: Reports that she had a panic attack this morning.  Had a spell where she wasn't able to stop crying.  This morning she felt drowsy after taking the clobazam.  She was in between sleep and awake feeling disoriented.      Sleep deprivation: consistent.     Day 2-3 cEEG monitoring per Dr Curiel:    INTERPRETATION:  Abnormal video EEG recording in the awake, drowsy, and sleep state(s):  Occasional left temporal slow waves.  Epileptiform discharges: No definitive epileptiform discharges or other epileptiform phenomena seen.   No definitive seizures.     Medical Condition:  Urine + cannabinoid and benzo  Voiding well since yesterday. Urine collection was negative for micro.  Denies symptoms of UTI and is currently with her menses.      PAIN ASSESSMENT PAST 24 HOURS:  Pain Assessment for the past 24 hrs:   Pain Rating Scale (NPRS) Intervention   02/22/24 0800 0 --   02/21/24 2000 0 Declines        Labs reviews this visit - Notably abnormal were the following:  Abnormal Labs Reviewed   CBC WITH DIFFERENTIAL - Abnormal; Notable for the following components:       Result Value    WBC 4.6 (*)     MCHC 35.9 (*)     All other components within normal limits   COMP METABOLIC PANEL - Abnormal; Notable for the following components:    Co2 18 (*)     Correct Calcium 8.4 (*)     All other components within normal limits   URINE DRUG SCREEN - Abnormal; Notable for the following components:    Benzodiazepines Positive (*)     Cannabinoid Metab Positive (*)     All other components within normal limits   URINALYSIS - Abnormal; Notable for the following components:    Color Orange (*)     Protein 30 (*)     Leukocyte Esterase Moderate (*)     Occult Blood Large (*)     All other components within normal limits    Narrative:     Collected By: [id number] MICHEAL RIVAS  Indication for culture:->Patient  WITHOUT an indwelling Little  catheter in place with new onset of Dysuria, Frequency,  Urgency, and/or Suprapubic pain   URINE MICROSCOPIC (W/UA) - Abnormal; Notable for the following components:    WBC 20-50 (*)     RBC 20-50 (*)     Bacteria Few (*)     All other components within normal limits    Narrative:     Collected By: [id number] MICHEAL ROMANEEN ROB  Indication for culture:->Patient WITHOUT an indwelling Little  catheter in place with new onset of Dysuria, Frequency,  Urgency, and/or Suprapubic pain        CURRENT MEDICATIONS AT THE TIME OF THIS ENCOUNTER:  Scheduled Medications   Medication Dose Frequency    clobazam  10 mg QAM    clobazam  20 mg QHS    enoxaparin (LOVENOX) injection  40 mg DAILY AT 1800    nicotine  21 mg Daily-0600     ibuprofen, 400 mg, Q6HRS PRN  LORazepam, 1 mg, Once PRN   Or  LORazepam, 2 mg, Once PRN  diphenhydrAMINE, 25 mg, Q8HRS PRN  nicotine polacrilex, 2 mg, Q HOUR PRN        EXAM:   Patient Vitals for the past 24 hrs:   BP Temp Temp src Pulse Resp SpO2 Weight   02/22/24 0719 103/73 36 °C (96.8 °F) Temporal (!) 57 16 98 % --   02/22/24 0405 -- -- -- -- -- -- 43.6 kg (96 lb 1.9 oz)   02/22/24 0404 104/71 36.4 °C (97.5 °F) Temporal 69 15 96 % --   02/21/24 2348 108/77 36.7 °C (98.1 °F) Temporal 67 16 97 % --   02/21/24 1945 109/76 36.8 °C (98.2 °F) Temporal 61 16 97 % --   02/21/24 1605 105/74 36.2 °C (97.2 °F) Temporal 61 16 95 % --   02/21/24 1212 114/84 36.8 °C (98.2 °F) Temporal 80 16 94 % --        .Temp:  [36 °C (96.8 °F)-36.8 °C (98.2 °F)] 36 °C (96.8 °F)  Pulse:  [57-80] 57  Resp:  [15-16] 16  BP: (103-114)/(71-84) 103/73  SpO2:  [94 %-98 %] 98 %     Physical Exam:  Physical Exam  Constitutional:       Appearance: She is well-developed.      Comments: Slight framed   HENT:      Head: Normocephalic and atraumatic.      Nose: Congestion (rhinnorhea) present.   Eyes:      General: Lids are normal.      Extraocular Movements: Extraocular movements intact.      Pupils: Pupils are  equal, round, and reactive to light.      Comments: Wears glasses   Cardiovascular:      Rate and Rhythm: Normal rate and regular rhythm.   Pulmonary:      Effort: Pulmonary effort is normal.   Musculoskeletal:         General: Normal range of motion.      Cervical back: Normal range of motion.   Skin:     General: Skin is warm.      Coloration: Skin is pale.      Comments: Picking disorder   Neurological:      Mental Status: She is oriented to person, place, and time.      Motor: No abnormal muscle tone.      Gait: Gait normal.      Deep Tendon Reflexes:      Reflex Scores:       Bicep reflexes are 1+ on the right side and 1+ on the left side.       Brachioradialis reflexes are 1+ on the right side and 1+ on the left side.       Patellar reflexes are 2+ on the right side and 2+ on the left side.       Achilles reflexes are 2+ on the right side and 2+ on the left side.     Comments: No observable changes in neurologic status.  See initial new patient examination for details.    Psychiatric:         Mood and Affect: Mood normal.         Speech: Speech normal.      Comments: +anxiety, tension        Neurological Exam   Neurological Exam  Mental Status  Awake, alert and oriented to person, place and time. Oriented to person, place, and time. Recent and remote memory are intact. Speech is normal. Language is fluent with no aphasia. Attention and concentration are normal. Fund of knowledge is appropriate for level of education.    Cranial Nerves  CN II: Vision test: Wears glasses. Visual acuity is normal. Visual fields full to confrontation. Wears glasses full-time.  CN III, IV, VI: Extraocular movements intact bilaterally. Normal lids and orbits bilaterally. Pupils equal round and reactive to light bilaterally.  CN V: Facial sensation is normal.  CN VII: Full and symmetric facial movement.  CN VIII: Hearing is normal.  CN IX, X: Palate elevates symmetrically. Normal gag reflex.  CN XI: Shoulder shrug strength is  normal.  CN XII: Tongue midline without atrophy or fasciculations.    Motor  Normal muscle bulk throughout. Decreased muscle tone. No abnormal involuntary movements.    Sensory  Sensation is intact to light touch, pinprick, vibration and proprioception in all four extremities.  Subjective numbness in bilateral lower extremities if she positions herself sitting directly on pelvic girdle..    Reflexes                                            Right                      Left  Brachioradialis                    1+                         1+  Biceps                                 1+                         1+  Patellar                                2+                         2+  Achilles                                2+                         2+    Coordination  Right: Finger-to-nose normal. Rapid alternating movement normal. Heel-to-shin normal.Left: Finger-to-nose normal. Rapid alternating movement normal. Heel-to-shin normal.    Gait  Normal casual, toe, heel and tandem gait. Normal gait.  No observable changes in neurologic status.  See initial new patient examination for details. .       I/O Current Shift  Date 02/22/24 0700 - 02/23/24 0659   Shift 6421-6464 4664-7548 4048-7328 24 Hour Total   INTAKE   Shift Total       OUTPUT   Urine         Number of Times Voided 1 x   1 x   Shift Total       NET            I/O Past 3 Shifts:  I/O last 3 completed shifts:  In: 840 [P.O.:840]  Out: -      I/O NET Since Admission  Net IO Since Admission: 840 mL [02/22/24 0945]     BOWEL OUTPUT LAST 7 DAYS  Bowel Output for the past 168 hrs:   Last BM   02/20/24 0800 02/19/24           ASSESSMENT, EDUCATION, AND/OR COUNSELING  This is a 28 y.o.  female who presents to the EMU to characterize and localize epileptic activity, identify the degree of epileptic burden, if any, evaluate for unrecognized seizures, and to optimize medications.     I had an extensive discussion with the patient about the purpose of the admission.  Discussed the purpose of provocative maneuvers such as photic stimulation, hyperventilation, and/or sleep deprivation which may unmask epileptic activity. We discussed that it may be necessary to decrease, or discontinue altogether, any and all anti-seizure medications to achieve the goals of the admission. I explained that this places patient at higher risk for seizures and status epilepticus, a serious life-threatening emergency with the potential for serious injury or death. It is therefore critical to the patient's safety that he/she monitored in the epilepsy monitoring unit for multiple days to mitigate the risk for serious injury or death. Discussed that the EMU and its personnel mitigate risk as there are rescue medications on hand if needed for any prolonged or repetitive seizures; trained nursing staff, EEG technicians, and a board certified epileptologist available 24/7; and continuous EEG and video surveillance being monitoring live by trained personnel at all times.       Discussed the importance of maintaining seizure precautions at all times. Discussed the importance of notifying nursing staff by using the call button for any concerns or needs, especially to assist with ambulation or transtions into and out of bed. Emphasized that the patient is at a higher for falling and potential subsequent injury due to the cumbersome equipment being worn, as well as other potential factors that may impair balance. It is therefore critical that the patient refrain from getting out of bed or walking without staff assistance.      Discussed the importance and rationale for DVT prophylaxis as well.      Finally, counseled the patient on using the push-button should she/he have any events concerning for seizure. Encourage visitors to press the button as well if a seizure or aura is witnessed and the patient is unable to press the button herself/himself.     Patient agreed to the above discussion. All questions/concerns  "were addressed.    PLAN:    The patient started having staring spells in childhood, as noted under event type #1. She had one BTC seizure in 2016 in context of Benadryl overdose. Her next BTC event was in 2023, and around that time she started having focal impaired seizures as well (event type #2).      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      She has significant mood issues with Vimpat, though these got better. She was not able to tolerate zonisamide. Bi-weekly telemed appointments with therapist and diagnosis of adult autism, ADHD, dysthymia and OCD with explosive anger.     Will continue to optimize anti-seizure medication for enhancing mood and quality of life.    Events in questions to capture    Event type #1: \"Staring spells\".  Year/Age of Onset: Childhood.  Initial features: Anxious and \"looping through her thoughts\" and she experience teja vu of a smell from her childhood. Lately she is getting strange taste (unpleasant taste). Sensation of butterflies in her stomach. She has dissociation sensation with these episodes.   Event features: No confusion nor loss of awareness. She stares ahead.  Post-ictal features: Feels a bit confused.   Duration: Seconds for smell/taste, but \"looping through her thoughts\" can go for hours.  Frequency: Almost daily.  Precipitating factors: Stress.     Event type #2: \"Focal seizures\".  Year/Age of Onset: June 2023  Initial features: Gets a \"weird\" feeling, teja vu, nauseous, intense sense of fear. At times weird taste and smell. Salivation.  Event features: Confused, not fully aware of her surroundings. Loses time. Oral automatisms.   Post-ictal features: Feels very tired.   Duration: 30-60 seconds.  Frequency: Several times per week.  Precipitating factors: Not clear.      Event type #3: \"Bilateral tonic-clonic seizure\".  Year/Age of Onset: She had seizure/status in 2016, and then the next event was in July 2023.   Initial " features: Features of event type #1.   Event features: Screams. Whole body tensing. Not clear if any lateralization. No tongue bite nor bladder/bowel incontinence. She has no recollection for the time during the event. She was not responsive during the event.   Post-ictal features: Tired, sore, and confused.   Duration: 1-2 minutes.   Frequency: One event in 2016, and one event on 07/01/2023.   Precipitating factors: Not clear    Continuous VEEG monitoring  Vitals and neurological checks as ordered  Telemetry  Routine Admission labs: CBC, CMP, antiseizure drug levels: Done  Other diagnostics if applicable: NA  HV and PS to be performed on Day 1 of admission and at the discretion of the attending epileptologist on subsequent days  Rescue Ativan Protocol ordered  Ativan 1mg  Sleep deprivation: Day 3: stopped  Active antiseizure regimen:    DAY 1, 2/20/2024: Lacosamide 100mg BID -- wean to 50mg tonight then stop. Clobazam 5mg QHS-- give 5mg now (by 1500)  then increase to 10mg BID starting tonight at 2100.     DAY 2: Lacosamide 100mg BID HELD since last dose PM 2/20/2024.  Continue clobazam with dose increase from 10mg BID to 10mg/20mg.    DAY 3: Lacosamide: Held.  Clobazam continued with 10mg/20mg      DISCHARGE ITEMS:  - Obtain Brain MRI -- will assist patient to schedule ASAP.  Phone number given to significant other to scheduled via telephone today.    ** Schedule Brain MRI: will need a one time ativan prescription for conscious sedation  Sunday, March 24th at 12:30pm check-in at [address] [place] [address].     - VNS candidate (if pharmaco-resistant epilepsy determined)  - Consider Keppra retrial or TPM?  She would like to review all possibilities for ASM's before consideration of surgical evaluation or even the VNS implant.  This is verbalized after significant eduction.     - Family Planning-- desires to have children in the future.  Folic acid 2mg daily, PNV, Calc/Vit D supplement    OTHER ITEMS:  DVT Ppx: Lovenox  and/or SCDs  DIET: REGULAR   Bowel regimen  PRN analgesics available for pain  PRN antiemetics available    CODE STATUS:   Full Code      BILLING DOCUMENTATION:     I spent a total of 35+ minutes of face-to-face time in this visit. Over 50% of the time of the visit today was spent on counseling and/or coordination of care wtih the patient and/or family, as above in assessment in plan. This does not include time spent on separately billable procedures/tests.     Marisela Clements, MSN, FNP-BC, APRN  Department of Neurology at [facility]  Phone: [phone]  Fax: [phone]  E-mail: [email or web address]

## 2024-02-22 NOTE — CARE PLAN
Problem: Knowledge Deficit - Standard  Goal: Patient and family/care givers will demonstrate understanding of plan of care, disease process/condition, diagnostic tests and medications  Outcome: Progressing     Problem: Seizure Precautions  Goal: Implementation of seizure precautions  Outcome: Progressing     Problem: Medication  Goal: Risk for seizure medication side effects will decrease  Outcome: Progressing     Problem: Knowledge Deficit - Seizures  Goal: Knowledge of seizure treatment regimen will improve  Outcome: Progressing   The patient is Stable - Low risk of patient condition declining or worsening    Shift Goals  Clinical Goals: vEEG  Patient Goals: capture seizure  Family Goals: triny    Progress made toward(s) clinical / shift goals:  vEEG in place.     Patient is not progressing towards the following goals:

## 2024-02-23 ENCOUNTER — PHARMACY VISIT (OUTPATIENT)
Dept: PHARMACY | Facility: MEDICAL CENTER | Age: 29
End: 2024-02-23
Payer: COMMERCIAL

## 2024-02-23 VITALS
HEART RATE: 77 BPM | OXYGEN SATURATION: 94 % | SYSTOLIC BLOOD PRESSURE: 105 MMHG | BODY MASS INDEX: 19.06 KG/M2 | TEMPERATURE: 98.1 F | RESPIRATION RATE: 16 BRPM | DIASTOLIC BLOOD PRESSURE: 73 MMHG | WEIGHT: 94.36 LBS

## 2024-02-23 PROBLEM — R94.01 ABNORMAL ELECTROENCEPHALOGRAM (EEG): Status: ACTIVE | Noted: 2024-02-23

## 2024-02-23 PROCEDURE — 95718 EEG PHYS/QHP 2-12 HR W/VEEG: CPT | Performed by: PSYCHIATRY & NEUROLOGY

## 2024-02-23 PROCEDURE — RXMED WILLOW AMBULATORY MEDICATION CHARGE: Performed by: NURSE PRACTITIONER

## 2024-02-23 PROCEDURE — 99239 HOSP IP/OBS DSCHRG MGMT >30: CPT | Performed by: NURSE PRACTITIONER

## 2024-02-23 PROCEDURE — 95713 VEEG 2-12 HR CONT MNTR: CPT | Performed by: PSYCHIATRY & NEUROLOGY

## 2024-02-23 PROCEDURE — A9270 NON-COVERED ITEM OR SERVICE: HCPCS | Performed by: NURSE PRACTITIONER

## 2024-02-23 PROCEDURE — 700102 HCHG RX REV CODE 250 W/ 637 OVERRIDE(OP): Performed by: NURSE PRACTITIONER

## 2024-02-23 RX ORDER — CLOBAZAM 10 MG/1
TABLET ORAL
Qty: 45 TABLET | Refills: 5 | Status: SHIPPED | OUTPATIENT
Start: 2024-02-23 | End: 2024-08-21

## 2024-02-23 RX ORDER — CLOBAZAM 20 MG/1
TABLET ORAL
Qty: 45 TABLET | Refills: 0 | Status: SHIPPED | OUTPATIENT
Start: 2024-02-23 | End: 2024-03-24

## 2024-02-23 RX ORDER — FOLIC ACID 1 MG/1
2 TABLET ORAL DAILY
Qty: 90 TABLET | Refills: 3 | Status: SHIPPED | OUTPATIENT
Start: 2024-02-23

## 2024-02-23 RX ORDER — ERGOCALCIFEROL 1.25 MG/1
50000 CAPSULE ORAL
Qty: 15 CAPSULE | Refills: 1 | Status: SHIPPED | OUTPATIENT
Start: 2024-02-23 | End: 2024-08-21

## 2024-02-23 RX ORDER — LORAZEPAM 0.5 MG/1
TABLET ORAL
Qty: 2 TABLET | Refills: 0 | Status: SHIPPED | OUTPATIENT
Start: 2024-02-23 | End: 2024-03-14

## 2024-02-23 RX ADMIN — CLOBAZAM 10 MG: 10 TABLET ORAL at 04:44

## 2024-02-23 ASSESSMENT — PAIN DESCRIPTION - PAIN TYPE: TYPE: ACUTE PAIN

## 2024-02-23 ASSESSMENT — FIBROSIS 4 INDEX: FIB4 SCORE: 0.47

## 2024-02-23 NOTE — PROCEDURES
[facility]    Continuous Video Electroencephalogram Report      Patient Name: Kim Brice  MRN: [id number]  #: [id number]  Date of Service: 07:01 on 2/23/2024 to 10:29 on 02/23/24.  Total Recording Time: 3 hours and 28 minutes.  Referring Provider: Brando Curiel M.D.    INDICATION:  Kim Brice 28 y.o. female presenting with seizure(s).    CURRENT ANTI-SEIZURE AND OTHER PERTINENT MEDICATIONS:   No current facility-administered medications for this encounter.    Current Outpatient Medications:     clobazam, Take 1 Tablet by mouth every morning AND 2 Tablets at bedtime. Do all this for 180 days.    LORazepam, Take 0.25mg PO 30 minutes prior to procedure.  May repeat 0.25mg PO at time of procedure.    folic acid, 2 mg, Oral, DAILY    cloBAZam, Take 1/2 tablet (10 mg) by mouth every morning AND 1 tablet (20 mg) at bedtime. Do all this for 30 days.    vitamin D2 (Ergocalciferol), 50,000 Units, Oral, Q7 DAYS    TECHNIQUE: CVEEG was set up by a Neurodiagnostic technologist who performed education to the patient and staff. A minimum of 23 electrodes and 23 channel recording was setup and performed by Neurodiagnostic technologist, in accordance with the international 10-20 system. Impedence, electrode integrity, and technical impressions were documented a minimum of every 2-24 hour period by a Neurodiagnostic Technologist and reviewed by Interpreting Physician. The study was reviewed in bipolar and referential montages. The recording examined the patient in the awake, drowsy, and sleep state(s).     DESCRIPTION OF THE RECORD:  EEG background: During maximal wakefulness, the background was continuous, intermittently asymmetrical, and 12 Hz posterior dominant rhythm.  Reactivity and state changes were present.  During drowsiness, a loss of myogenic artifact and theta/delta frequencies were seen.     Sleep was captured and was characterized by diffuse background delta/theta activity with a loss of myogenic  Physician Documentation                                                                           

 [facility]                                                                 

Name: Albert Potter                                                                                

Age: 37 yrs                                                                                       

Sex: Male                                                                                         

: 1983                                                                                   

MRN: [id number]                                                                                   

Arrival Date: 2020                                                                          

Time: 13:58                                                                                       

Account#: [id number]                                                                            

Bed 18                                                                                            

Private MD: Erlin Montenegro R                                                                       

ED Physician Micah Mcclure                                                                      

HPI:                                                                                              

                                                                                             

14:31 This 37 yrs old  Male presents to ER via Ambulatory with complaints of Rash.   pm1 

14:31 The patient's rash thought to be caused by an unknown cause. The rash is located on the pm1 

      chest, right arm, left arm and neck. The rash can be described as urticarial. Onset:        

      The symptoms/episode began/occurred 5 day(s) ago. Associated signs and symptoms:            

      Pertinent positives: itching, Pertinent negatives: fever, swelling of lips, swelling of     

      throat, swelling of tongue, vomiting, wheezing. Severity of symptoms: in the emergency      

      department the symptoms are worse rash appeared to bilateral arms. Treatment given at       

      home: just finished prescription medications of steroid and pepcid today. The patient       

      has been recently seen at the [facility] Emergency Department,       

      last week, for similar complaints.                                                          

                                                                                                  

Historical:                                                                                       

- Allergies:                                                                                      

14:13 No Known Allergies;                                                                     ll1 

- PSHx:                                                                                           

14:13 left ankle; back;                                                                       ll1 

                                                                                                  

- Immunization history:: Flu vaccine is not up to date.                                           

- Social history:: Smoking status: Patient denies any tobacco usage or history of.                

                                                                                                  

                                                                                                  

ROS:                                                                                              

14:31 Constitutional: Negative for fever, chills, and weight loss, Cardiovascular: Negative   pm1 

      for chest pain, palpitations, and edema, Respiratory: Negative for shortness of breath,     

      cough, wheezing, and pleuritic chest pain, Abdomen/GI: Negative for abdominal pain,         

      nausea, vomiting, diarrhea, and constipation.                                               

14:31 Back: Negative for injury and pain, MS/Extremity: Negative for injury and deformity.    pm1 

14:31 Neuro: Negative for headache, weakness, numbness, tingling, and seizure.                    

14:31 Skin: Positive for rash, of the right arm and left arm and neck and chest.                  

                                                                                                  

Exam:                                                                                             

14:31 Constitutional:  This is a well developed, well nourished patient who is awake, alert,  pm1 

      and in no acute distress. Head/Face:  Normocephalic, atraumatic.                            

14:31 Cardiovascular: Exam negative for  acute changes, Rate: normal, Rhythm: regular,            

      Pulses: no pulse deficits are appreciated.                                                  

14:31 Respiratory: Exam negative for  acute changes, respiratory distress, shortness of           

      breath.                                                                                     

14:31 Skin: Appearance: normal except for affected area, consistent with  urticaria, on the       

      left arm and right arm and chest and neck.                                                  

14:31 Neuro: Exam negative for acute changes, Orientation: is normal, Mentation: is normal,       

      Motor: is normal, moves all fours.                                                          

                                                                                                  

Vital Signs:                                                                                      

14:13  / 77; Pulse 100; Resp 18; Temp 98.0; Pulse Ox 100% on R/A; Pain 0/10;            ll1 

16:40  / 101; Pulse 82; Resp 17; Pulse Ox 100% ; Pain 0/10;                             ll1 

                                                                                                  

MDM:                                                                                              

14:08 Patient medically screened.                                                             pm1 

16:16 Data reviewed: vital signs. Data interpreted: Pulse oximetry: on room air is 100 %.     pm1 

      Interpretation: normal. Counseling: I had a detailed discussion with the patient and/or     

      guardian regarding: the historical points, exam findings, and any diagnostic results        

      supporting the discharge/admit diagnosis, the need for outpatient follow up, for            

      definitive care, an allergy/immunology specialist, to return to the emergency               

      department if symptoms worsen or persist or if there are any questions or concerns that     

      arise at home.                                                                              

                                                                                                  

Administered Medications:                                                                         

14:31 Drug: Decadron 10 mg Route: IM; Site: right gluteus;                                    ll1 

16:30 Follow up: Response: No adverse reaction; RASS: Alert and Calm (0)                      ll1 

14:31 Drug: Pepcid 20 mg Route: PO;                                                           ll1 

16:40 Follow up: Response: No adverse reaction; RASS: Alert and Calm (0)                      ll1 

14:31 Drug: Benadryl 25 mg Route: PO;                                                         ll1 

16:40 Follow up: Response: No adverse reaction; RASS: Alert and Calm (0)                      ll1 

                                                                                                  

                                                                                                  

Disposition:                                                                                      

                                                                                             

08:58 Co-signature as Attending Physician, Micah Mcclure MD I agree with the assessment and  nic 

      plan of care.                                                                               

                                                                                                  

Disposition:                                                                                      

20 16:17 Discharged to Home. Impression: Urticaria, unspecified.                            

- Condition is Stable.                                                                            

- Discharge Instructions: Hives, Rash.                                                            

- Prescriptions for Benadryl 25 mg Oral Capsule - take 1 capsule by ORAL route every 6            

  hours As needed; 30 tablet. Pepcid 20 mg Oral Tablet - take 1 tablet by ORAL route              

  every 12 hours for 10 days; 20 tablet. Medrol (Terrence) 4 mg Oral Tablets, Dose Pack -              

  take 1 tablet by ORAL route as directed - follow package instructions; 1 packet.                

- Medication Reconciliation Form, Thank You Letter, Antibiotic Education, Prescription            

  Opioid Use form.                                                                                

- Follow up: Emergency Department; When: As needed; Reason: Worsening of condition.               

  Follow up: Private Physician; When: 2 - 3 days; Reason: Recheck today's complaints,             

  Continuance of care, Re-evaluation by your physician.                                           

- Problem is new.                                                                                 

- Symptoms have improved.                                                                         

                                                                                                  

                                                                                                  

                                                                                                  

Signatures:                                                                                       

Micah Mcclure MD MD cha Marinas, Patrick, NP                    NP   pm1                                                  

George Curry RN                       RN   ll1                                                  

                                                                                                  

Corrections: (The following items were deleted from the chart)                                    

                                                                                             

16:40 16:17 2020 16:17 Discharged to Home. Impression: Urticaria, unspecified.          ll1 

      Condition is Stable. Forms are Medication Reconciliation Form, Thank You Letter,            

      Antibiotic Education, Prescription Opioid Use. Follow up: Emergency Department; When:       

      As needed; Reason: Worsening of condition. Follow up: Private Physician; When: 2 - 3        

      days; Reason: Recheck today's complaints, Continuance of care, Re-evaluation by your        

      physician. Problem is new. Symptoms have improved. pm1                                      

                                                                                                  

************************************************************************************************** artifact.  N2 sleep transients in the form of sleep spindles and vertex waves were seen in the leads over the central regions.     ACTIVATION PROCEDURES:   None.    ICTAL AND INTERICTAL FINDINGS:   There were very rare, left temporal, sharp discharges.    There was intermittent, independent, bi-temporal, focal slowing, of alternating predominance.     No electroclinical or electrographic seizures were reported or recorded during the study.     EKG: Sampling of the EKG recording did not demonstrate any abnormalities    EVENTS:  None.     INTERPRETATION:  This was an abnormal video EEG recording in the awake, drowsy, and sleep state(s):  The presence of intermittent, independent, bi-temporal, focal slowing, of alternating predominance, is suggestive of cerebral dysfunction in those region. This finding might be see in context of structural lesion and/or ictal onset zone, among other considerations. Clinical and radiological correlate is recommended.   The presence of very rare, left temporal sharp discharges, is suggestive of increased propensity toward focal seizures arising from that region.   No definitive seizures.       Brando Curiel MD  Neurology Attending, Epilepsy Program  [facility]

## 2024-02-23 NOTE — PROGRESS NOTES
Pt given discharge instructions and education. IV removed, pt has all of her belongings including [id number]. Pt escorted to  parking via wheelchair by nursing staff.
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
## 2024-02-23 NOTE — CARE PLAN
The patient is Stable - Low risk of patient condition declining or worsening    Shift Goals  Clinical Goals: cVEEG/Seizure Precautions  Patient Goals: Capture Seizure Activity  Family Goals: CARTER    Progress made toward(s) clinical / shift goals: Assumed care of patient at 1915. Patient is A&Ox4, Q4hr neuro checks are being completed. Continuous VEEG in place with fall/seizure/aspiration precautions. Bed is low and locked, bed alarm is on, call light is within reach, hourly rounding continues.     Problem: Seizure EEG Monitoring  Goal: Appropriate Monitoring of EEG patient  Outcome: Progressing  Flowsheets (Taken 2/23/2024 0336)  EEG Monitoring:   Verifed patient in view of camera at all times   Attended patient while in bathroom   Continuous video monitoring by sitter   Reviewed remote cardiac monitoring orders   Recorded and documented events     Problem: Seizure Precautions  Goal: Implementation of seizure precautions  Outcome: Progressing  Note: The following are all in place:   1.  Padded side rails up at all times  2.  Suction equipment and oxygen delivery system at bedside  3.  Continuous pulse oximeter in use  4.  Implement fall precautions, bed alarm on, bed in lowest position  5.  IV access (per order)  6.  Provide low stimulus environment, avoid exposure to triggers  7.  Instruct patient to use call light/seizure button if having warning signs of impending seizure     Patient is not progressing towards the following goals: N/A

## 2024-02-23 NOTE — DISCHARGE INSTRUCTIONS
Current Medications:  - New Rx's for Vitamin D3 50,000 units weekly.  Prescription to be sent.  - Daily prenatal vitamin or Centrum silver vitamin  - Folic acid 2mg daily.  Prescription to be sent.    Continue clobazam 10mg each morning and 20mg each night.      Brain MRI ordered and scheduled March 24, 2024 at 1pm with check-in at 12:30pm.        Please let our office know if you have any changes in your seizure frequency and/characteristics. Otherwise, please keep the diary of your events and bring it with you at the time of your next follow up visit with our office.     Please take folic acid 2mg daily. This is an over-the-counter supplement that is recommended to prevent certain developmental problems in your baby, in case you become pregnant in the future.    Please let our office know as soon as you become pregnant or plan to become pregnant.    If you are caring for a baby/young child, please make sure to be sitting on a soft surface while holding your baby/young child, so in case you have a seizure, your baby/young child is not injured due to fall.     Please let us know if you observe that your baby is excessively sleepy/has other changes and the pediatrician feels that there are no other explanations except possible adverse effects of antiseizure medication(s) your are taking while nursing your baby.     Please note that the following might precipitate seizures: missed doses of antiseizure medications, being sick with fever, stress, fatigue, sleep deprivation, not eating regularly, not drinking enough water, drinking too much alcohol, stopping alcohol suddenly if you are currently using it on a regular/daily basis, and/or using recreational drugs, among others.    Please note that the following might lead to an injury, potentially a life-threatening injury, in case you have a seizure and/or lose awareness while:   - being in a large body of water by yourself, such as bath, pool, lake, ocean, among others  (risk of drowning)   - being on unprotected heights (risk of fall)   - being around and/or operating heavy machinery (risk of injury)   - being around open fire/hot surfaces (risk of burns)   - any other activities/circumstances, in which if you lose awareness, you might injure yourself and/or others.    Please call for help (crisis line and/or 911) in case you have thoughts of harming yourself and/or others.    Please proceed cautiously with driving.    ------------------------------------------------------------------------------------------  Instructions for your family/caregivers:  Please call 911 if the patient has a seizure longer than 2-3 minutes, if seizures are back to back without her recovering to her baseline, or she does not start recovering within 5-10 minutes after the seizure stops. During the seizure - please turn her on her side, please make sure her head is protected (for example, you should put a pillow under her head, if one is available), and please do not put anything in her mouth.   -------------------------------------------------------------------------------------------    It is important that your seizures are well controlled and you have none or have them rarely. In addition to avoiding injury related to breakthrough seizures, frequent seizures increase risk of SUDEP (sudden unexpected death in epilepsy), where a person goes into a seizure and then never wakes up - this is a rare complication of seizure disorder; one of the best available ways to prevent it is to control your seizures well.     Due to the high volume of patients we are trying to help, your physician will not be able to respond by phone or in MyChart to your routine concerns between appointments.  This does not reflect a lack of interest or concern for you or your diagnosis.  Please bring these questions and concerns to your appointment where your physician can answer.  Please relay more pressing concerns to our office,  either via [employer], or by phone; if not able to reach us please visit nearby Urgent Care Center or Emergency Department.  If any emergent medical needs, please seek emergent medical help and/or call 911.    Please note that we are not able to fill out paperwork that might be related to your work, utility company, disability, and/or driving, among others, in between the visits.  Please schedule a dedicated appointment to address your paperwork, so we can do that in a timely manner.  This is not due to lack of concern or interest for your disease-related work/administrative problems, but to make sure that we provide the best possible care and to fill out your paperwork in a correct and timely manner.    Thank you for entrusting your neurological care to [facility] and we look forward to continuing to serve you.    Discharge Instructions    Discharged to home by car with relative. Discharged via wheelchair, hospital escort: Yes.  Special equipment needed: Not Applicable    Be sure to schedule a follow-up appointment with your primary care doctor or any specialists as instructed.     Discharge Plan:   Diet Plan: Discussed  Confirmed Follow up Appointment: Appointment Scheduled  Confirmed Symptoms Management: Discussed  Medication Reconciliation Updated: Yes  Influenza Vaccine Indication: Patient Refuses    I understand that a diet low in cholesterol, fat, and sodium is recommended for good health. Unless I have been given specific instructions below for another diet, I accept this instruction as my diet prescription.   Other diet: Regular diet    Special Instructions: None    -Is this patient being discharged with medication to prevent blood clots?  No    Is patient discharged on Warfarin / Coumadin?   No

## 2024-02-25 LAB — LACOSAMIDE SERPL-MCNC: 4.4 UG/ML (ref 1–10)

## 2024-02-26 PROBLEM — E43 SEVERE PROTEIN-CALORIE MALNUTRITION (HCC): Status: ACTIVE | Noted: 2024-02-26

## 2024-02-26 NOTE — DOCUMENTATION QUERY
"                                                                         [facility] hospital                                                                       Query Response Note      PATIENT:               JOSE ALFREDO PORRAS  ACCT #:                  [id number]  MRN:                     [id number]  :                      1995  ADMIT DATE:       2024 6:00 AM  DISCH DATE:        2024 12:40 PM  RESPONDING  PROVIDER #:        [id number]           QUERY TEXT:    Based upon your judgment and the above clinical indicators, please select the most appropriate diagnosis for the findings.    Note: If you agree with a diagnosis listed above, please remember to include it in your concurrent daily documentation and onto the Discharge Summary.      The patient's clinical indicators include:  28 year old female admitted for seizure evaluation.     Consult Lang \"Malnutrition Risk: Per ASPEN guidelines, pt meets criteria for severe malnutrition in the context of acute illness related to seizure activity/medication changes as evidence by pt reports of eating <50% of normal and moderate muscle wasting noted  in multiple regions.\"  \"Reports her appetite has decreased with newly added medication\"  \"Pt  declined all snacks and supplements offered at this time\"  \"Moderate muscle wasting noted in the clavicle, acromion and interosseous region.\"  BMI 18.43    Risk factors: ADHD, autism, OCD  Treatment: labs, RD consult, may benefit from appetite stimulate, encourage intake, document all PO, monitor weight    If you have any questions, please contact:  Jael Moody RN [facility]  [email or web address]  Jael Moody [address] [employer]  Options provided:   -- Severe protein-calorie malnutrition is a valid diagnosis treated this admission   -- Severe malnutrition, ruled out   -- Moderate malnutrition   -- Mild malnutrition   -- Other explanation, please specify   -- Unable to determine      Query created by: Ovi, " Jael on 2/22/2024 7:29 AM    RESPONSE TEXT:    Severe protein-calorie malnutrition is a valid diagnosis treated this admission          Electronically signed by:  KARLA BOATENG 2/26/2024 3:14 PM

## 2024-02-28 ENCOUNTER — TELEPHONE (OUTPATIENT)
Dept: HEALTH INFORMATION MANAGEMENT | Facility: OTHER | Age: 29
End: 2024-02-28
Payer: MEDICAID

## 2024-02-28 LAB — LACOSAMIDE SERPL-MCNC: 4 UG/ML (ref 1–10)

## 2024-03-24 ENCOUNTER — HOSPITAL ENCOUNTER (OUTPATIENT)
Dept: RADIOLOGY | Facility: MEDICAL CENTER | Age: 29
End: 2024-03-24
Attending: PSYCHIATRY & NEUROLOGY
Payer: MEDICAID

## 2024-03-24 DIAGNOSIS — R56.9 SEIZURES (HCC): ICD-10-CM

## 2024-03-24 PROCEDURE — A9579 GAD-BASE MR CONTRAST NOS,1ML: HCPCS | Mod: UD | Performed by: PSYCHIATRY & NEUROLOGY

## 2024-03-24 PROCEDURE — 700117 HCHG RX CONTRAST REV CODE 255: Mod: UD | Performed by: PSYCHIATRY & NEUROLOGY

## 2024-03-24 PROCEDURE — 70553 MRI BRAIN STEM W/O & W/DYE: CPT

## 2024-03-24 RX ADMIN — GADOTERIDOL 10 ML: 279.3 INJECTION, SOLUTION INTRAVENOUS at 13:25

## 2024-04-01 ENCOUNTER — TELEPHONE (OUTPATIENT)
Dept: NEUROLOGY | Facility: MEDICAL CENTER | Age: 29
End: 2024-04-01
Payer: MEDICAID

## 2024-04-01 NOTE — TELEPHONE ENCOUNTER
NEUROLOGY PATIENT PRE-VISIT PLANNING     Patient was NOT contacted to complete PVP.  Note: Patient will not be contacted if there is no indication to call.     Patient Appointment is scheduled as: Established Patient     Is visit type and length scheduled correctly? Yes    [facility] is checked in Patient Demographics? Yes    3.   Is referral attached to visit? Yes    4. Were records received from referring provider? Yes    4. Patient was NOT contacted to have someone accompany them to visit.     5. Is this appointment scheduled as a Hospital Follow-Up? Yes    6. Does the patient require any pre procedure or post procedure follow up? No    7. If any orders were placed at last visit or intended to be done for this visit do we have Results/Consult Notes? Yes  Labs - Labs ordered, completed on 2.20.2024 and results are in chart.  Imaging - Imaging ordered, completed and results are in chart.  Referrals - Previous referral to EEG on 1/03/2024 current EEG 2/23/2024  Note: If patient appointment is for lab or imaging review and patient did not complete the studies, check with provider if OK to reschedule patient until completed.    8. If patient appointment is for Botox - is order pended for provider? N/A    9. Was Plan Assessment from last Neurology Office Visit Reviewed?  Yes

## 2024-04-03 ENCOUNTER — APPOINTMENT (OUTPATIENT)
Dept: NEUROLOGY | Facility: MEDICAL CENTER | Age: 29
End: 2024-04-03
Attending: PSYCHIATRY & NEUROLOGY
Payer: MEDICAID

## 2024-04-03 VITALS
TEMPERATURE: 99.9 F | HEART RATE: 80 BPM | BODY MASS INDEX: 18.58 KG/M2 | DIASTOLIC BLOOD PRESSURE: 70 MMHG | HEIGHT: 59 IN | OXYGEN SATURATION: 100 % | SYSTOLIC BLOOD PRESSURE: 102 MMHG | WEIGHT: 92.15 LBS | RESPIRATION RATE: 16 BRPM

## 2024-04-03 DIAGNOSIS — R56.9 SEIZURES (HCC): ICD-10-CM

## 2024-04-03 PROCEDURE — 99214 OFFICE O/P EST MOD 30 MIN: CPT | Performed by: PSYCHIATRY & NEUROLOGY

## 2024-04-03 PROCEDURE — 3074F SYST BP LT 130 MM HG: CPT | Performed by: PSYCHIATRY & NEUROLOGY

## 2024-04-03 PROCEDURE — 3078F DIAST BP <80 MM HG: CPT | Performed by: PSYCHIATRY & NEUROLOGY

## 2024-04-03 PROCEDURE — 99211 OFF/OP EST MAY X REQ PHY/QHP: CPT | Performed by: PSYCHIATRY & NEUROLOGY

## 2024-04-03 RX ORDER — DEXTROAMPHETAMINE SULFATE, DEXTROAMPHETAMINE SACCHARATE, AMPHETAMINE SULFATE AND AMPHETAMINE ASPARTATE 1.25; 1.25; 1.25; 1.25 MG/1; MG/1; MG/1; MG/1
5 CAPSULE, EXTENDED RELEASE ORAL
COMMUNITY
Start: 2024-03-07

## 2024-04-03 RX ORDER — CLOBAZAM 10 MG/1
TABLET ORAL
Qty: 75 TABLET | Refills: 5 | Status: SHIPPED | OUTPATIENT
Start: 2024-04-03 | End: 2024-09-30

## 2024-04-03 ASSESSMENT — FIBROSIS 4 INDEX: FIB4 SCORE: 0.47

## 2024-04-03 ASSESSMENT — PATIENT HEALTH QUESTIONNAIRE - PHQ9: CLINICAL INTERPRETATION OF PHQ2 SCORE: 0
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
## 2024-04-03 NOTE — PATIENT INSTRUCTIONS
Please take Onfi 10 mg in AM and 15 mg in PM.     Please note that you are at increased risk for seizures during the period after decreasing the dose of Onfi.      Please seek emergent medical/psychiatric help if any thoughts of harming yourself/others.     Please let our office know if you have any changes in your seizure frequency and/characteristics. Otherwise, please keep the diary of your events and bring it with you at the time of your next follow up visit with our office.     Please take vitamin D3 [id number] internation units daily.     Please take folic acid 1 mg daily. This is an over-the-counter supplement that is recommended to prevent certain developmental problems in your baby, in case you become pregnant in the future.    Please let our office know as soon as you become pregnant or plan to become pregnant.    If you are caring for a baby/young child, please make sure to be sitting on a soft surface while holding your baby/young child, so in case you have a seizure, your baby/young child is not injured due to fall.     Please let us know if you observe that your baby is excessively sleepy/has other changes and the pediatrician feels that there are no other explanations except possible adverse effects of antiseizure medication(s) your are taking while nursing your baby.     Please note that the following might precipitate seizures: missed doses of antiseizure medications, being sick with fever, stress, fatigue, sleep deprivation, not eating regularly, not drinking enough water, drinking too much alcohol, stopping alcohol suddenly if you are currently using it on a regular/daily basis, and/or using recreational drugs, among others.    Please note that the following might lead to an injury, potentially a life-threatening injury, in case you have a seizure and/or lose awareness while:   - being in a large body of water by yourself, such as bath, pool, lake, ocean, among others (risk of drowning)   - being  on unprotected heights (risk of fall)   - being around and/or operating heavy machinery (risk of injury)   - being around open fire/hot surfaces (risk of burns)   - any other activities/circumstances, in which if you lose awareness, you might injure yourself and/or others.    Please call for help (crisis line and/or 911) in case you have thoughts of harming yourself and/or others.    Please abstain from driving until further notice.    ------------------------------------------------------------------------------------------  Instructions for your family/caregivers:  Please call 911 if the patient has a seizure longer than 2-3 minutes, if seizures are back to back without her recovering to her baseline, or she does not start recovering within 5-10 minutes after the seizure stops. During the seizure - please turn her on her side, please make sure her head is protected (for example, you should put a pillow under her head, if one is available), and please do not put anything in her mouth.   -------------------------------------------------------------------------------------------    It is important that your seizures are well controlled and you have none or have them rarely. In addition to avoiding injury related to breakthrough seizures, frequent seizures increase risk of SUDEP (sudden unexpected death in epilepsy), where a person goes into a seizure and then never wakes up - this is a rare complication of seizure disorder; one of the best available ways to prevent it is to control your seizures well.     Due to the high volume of patients we are trying to help, your physician will not be able to respond by phone or in MyChart to your routine concerns between appointments.  This does not reflect a lack of interest or concern for you or your diagnosis.  Please bring these questions and concerns to your appointment where your physician can answer.  Please relay more pressing concerns to our office, either via MyChart,  or by phone; if not able to reach us please visit nearby Urgent Care Center or Emergency Department.  If any emergent medical needs, please seek emergent medical help and/or call 911.    Please note that we are not able to fill out paperwork that might be related to your work, utility company, disability, and/or driving, among others, in between the visits.  Please schedule a dedicated appointment to address your paperwork, so we can do that in a timely manner.  This is not due to lack of concern or interest for your disease-related work/administrative problems, but to make sure that we provide the best possible care and to fill out your paperwork in a correct and timely manner.    Thank you for entrusting your neurological care to [facility] and we look forward to continuing to serve you.
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
## 2024-05-10 ENCOUNTER — OFFICE VISIT (OUTPATIENT)
Dept: NEUROLOGY | Facility: MEDICAL CENTER | Age: 29
End: 2024-05-10
Attending: STUDENT IN AN ORGANIZED HEALTH CARE EDUCATION/TRAINING PROGRAM
Payer: MEDICAID

## 2024-05-10 VITALS
HEIGHT: 59 IN | DIASTOLIC BLOOD PRESSURE: 68 MMHG | WEIGHT: 83.33 LBS | OXYGEN SATURATION: 98 % | HEART RATE: 78 BPM | TEMPERATURE: 98.5 F | BODY MASS INDEX: 16.8 KG/M2 | RESPIRATION RATE: 20 BRPM | SYSTOLIC BLOOD PRESSURE: 112 MMHG

## 2024-05-10 DIAGNOSIS — F39 MOOD DISORDER (HCC): ICD-10-CM

## 2024-05-10 DIAGNOSIS — F84.0 AUTISM: ICD-10-CM

## 2024-05-10 DIAGNOSIS — R56.9 SEIZURES (HCC): ICD-10-CM

## 2024-05-10 PROCEDURE — 3074F SYST BP LT 130 MM HG: CPT | Performed by: STUDENT IN AN ORGANIZED HEALTH CARE EDUCATION/TRAINING PROGRAM

## 2024-05-10 PROCEDURE — 3078F DIAST BP <80 MM HG: CPT | Performed by: STUDENT IN AN ORGANIZED HEALTH CARE EDUCATION/TRAINING PROGRAM

## 2024-05-10 PROCEDURE — G2212 PROLONG OUTPT/OFFICE VIS: HCPCS | Performed by: STUDENT IN AN ORGANIZED HEALTH CARE EDUCATION/TRAINING PROGRAM

## 2024-05-10 PROCEDURE — 99215 OFFICE O/P EST HI 40 MIN: CPT | Performed by: STUDENT IN AN ORGANIZED HEALTH CARE EDUCATION/TRAINING PROGRAM

## 2024-05-10 RX ORDER — LAMOTRIGINE 25 MG/1
TABLET ORAL
Qty: 49 TABLET | Refills: 0 | Status: SHIPPED | OUTPATIENT
Start: 2024-05-10 | End: 2024-06-21

## 2024-05-10 RX ORDER — CLOBAZAM 10 MG/1
TABLET ORAL
Qty: 150 TABLET | Refills: 0 | Status: SHIPPED | OUTPATIENT
Start: 2024-05-10 | End: 2024-07-09

## 2024-05-10 ASSESSMENT — FIBROSIS 4 INDEX: FIB4 SCORE: 0.47

## 2024-05-10 NOTE — PATIENT INSTRUCTIONS
Lamotrigine (Lamictal) Plan    Weeks 1 & 2: Lamictal 12.5 mg daily  Weeks 3 & 4: Lamictal 12.5 mg twice daily   Weeks 5 & 6: Lamictal 25 mg twice daily      Please call our office immediately if you develop a rash, itching, or fever. If you have any new mouth blistering and a skin rash go to the ER immediately. These symptoms can be signs of a serious reaction to the medication. We increase the dose slowly because doing so typically prevents these issues from happening.     Common side effects could include dizziness, headache, blurred vision, nausea, sleepiness, nasal congestion. Please let us know if you experience any bothersome side effects.      When you are getting low on the pills, please call or message the office for a refill.

## 2024-06-18 DIAGNOSIS — R56.9 SEIZURES (HCC): ICD-10-CM

## 2024-06-18 NOTE — TELEPHONE ENCOUNTER
Received request via: Pharmacy    Medication Name/Dosage  lamoTRIgine (LAMICTAL) 25 MG Tab     When was medication last prescribed 05/10/2024    How many refills were previously provided 0    How many Refills does he patient have left from last prescription 0    Was the patient seen in the last year in this department? Yes   Date of last office visit 05/10/2024     Per last Neurology Office Visit, when was the date of next follow up visit set for?                            Date of office visit follow up request 6 weeks     Does the patient have an upcoming appointment? Yes   If yes, when 06/28/2024             If no, schedule appointment na    Does the patient have [facility] and need 100 day supply (blood pressure, diabetes and cholesterol meds only)? Patient does not have SCP    Call spoke to patient done with titration.

## 2024-06-19 RX ORDER — LAMOTRIGINE 25 MG/1
75 TABLET ORAL 2 TIMES DAILY
Qty: 84 TABLET | Refills: 0 | Status: SHIPPED | OUTPATIENT
Start: 2024-06-19 | End: 2024-07-03

## 2024-06-28 ENCOUNTER — APPOINTMENT (OUTPATIENT)
Dept: NEUROLOGY | Facility: MEDICAL CENTER | Age: 29
End: 2024-06-28
Attending: STUDENT IN AN ORGANIZED HEALTH CARE EDUCATION/TRAINING PROGRAM
Payer: MEDICAID

## 2024-07-05 DIAGNOSIS — R56.9 SEIZURES (HCC): ICD-10-CM

## 2024-07-05 RX ORDER — LAMOTRIGINE 25 MG/1
TABLET ORAL
Qty: 84 TABLET | Refills: 0 | Status: SHIPPED | OUTPATIENT
Start: 2024-07-05 | End: 2024-07-23

## 2024-07-23 ENCOUNTER — OFFICE VISIT (OUTPATIENT)
Dept: NEUROLOGY | Facility: MEDICAL CENTER | Age: 29
End: 2024-07-23
Attending: STUDENT IN AN ORGANIZED HEALTH CARE EDUCATION/TRAINING PROGRAM
Payer: MEDICAID

## 2024-07-23 VITALS
DIASTOLIC BLOOD PRESSURE: 52 MMHG | WEIGHT: 82.89 LBS | HEART RATE: 89 BPM | RESPIRATION RATE: 14 BRPM | SYSTOLIC BLOOD PRESSURE: 90 MMHG | OXYGEN SATURATION: 96 % | BODY MASS INDEX: 16.71 KG/M2 | HEIGHT: 59 IN

## 2024-07-23 DIAGNOSIS — G40.109 FOCAL EPILEPSY (HCC): ICD-10-CM

## 2024-07-23 DIAGNOSIS — E43 SEVERE PROTEIN-CALORIE MALNUTRITION (HCC): ICD-10-CM

## 2024-07-23 PROCEDURE — 99215 OFFICE O/P EST HI 40 MIN: CPT | Performed by: STUDENT IN AN ORGANIZED HEALTH CARE EDUCATION/TRAINING PROGRAM

## 2024-07-23 PROCEDURE — 99417 PROLNG OP E/M EACH 15 MIN: CPT | Performed by: STUDENT IN AN ORGANIZED HEALTH CARE EDUCATION/TRAINING PROGRAM

## 2024-07-23 PROCEDURE — 99212 OFFICE O/P EST SF 10 MIN: CPT | Performed by: STUDENT IN AN ORGANIZED HEALTH CARE EDUCATION/TRAINING PROGRAM

## 2024-07-23 PROCEDURE — 3078F DIAST BP <80 MM HG: CPT | Performed by: STUDENT IN AN ORGANIZED HEALTH CARE EDUCATION/TRAINING PROGRAM

## 2024-07-23 PROCEDURE — 3074F SYST BP LT 130 MM HG: CPT | Performed by: STUDENT IN AN ORGANIZED HEALTH CARE EDUCATION/TRAINING PROGRAM

## 2024-07-23 RX ORDER — CLOBAZAM 10 MG/1
TABLET ORAL
Qty: 60 TABLET | Refills: 1 | Status: SHIPPED | OUTPATIENT
Start: 2024-07-23 | End: 2024-09-20

## 2024-07-23 RX ORDER — CLOBAZAM 10 MG/1
TABLET ORAL
COMMUNITY
Start: 2024-07-15 | End: 2024-07-23 | Stop reason: SDUPTHER

## 2024-07-23 RX ORDER — ALBUTEROL SULFATE 90 UG/1
AEROSOL, METERED RESPIRATORY (INHALATION)
COMMUNITY
Start: 2024-07-13

## 2024-07-23 RX ORDER — LAMOTRIGINE 100 MG/1
TABLET ORAL
Qty: 180 TABLET | Refills: 2 | Status: SHIPPED | OUTPATIENT
Start: 2024-07-23

## 2024-07-23 RX ORDER — MONTELUKAST SODIUM 10 MG/1
TABLET ORAL
COMMUNITY
Start: 2024-06-23

## 2024-07-23 ASSESSMENT — FIBROSIS 4 INDEX: FIB4 SCORE: 0.48

## 2024-10-28 ENCOUNTER — APPOINTMENT (OUTPATIENT)
Dept: NEUROLOGY | Facility: MEDICAL CENTER | Age: 29
End: 2024-10-28
Attending: STUDENT IN AN ORGANIZED HEALTH CARE EDUCATION/TRAINING PROGRAM
Payer: MEDICAID

## 2024-11-01 DIAGNOSIS — G40.109 FOCAL EPILEPSY (HCC): ICD-10-CM

## 2024-11-01 NOTE — TELEPHONE ENCOUNTER
Received request via: Patient    Medication Name/Dosage    Disp Refills Start End    lamoTRIgine (LAMICTAL) 100 MG Tab          When was medication last prescribed 07/23/2024    How many refills were previously provided 2    How many Refills does he patient have left from last prescription 1    Was the patient seen in the last year in this department? Yes   Date of last office visit yes     Per last Neurology Office Visit, when was the date of next follow up visit set for?                            Date of office visit follow up request 3 months  Patient just moved to [place] and needs 30 day supply. She is in the process of finding new provider.  Does the patient have an upcoming appointment? N  Does the patient have [facility] and need 100 day supply (blood pressure, diabetes and cholesterol meds only)? Patient does not have SCP

## 2024-11-04 NOTE — TELEPHONE ENCOUNTER
Dr. Sarah was doing Lamotrigine titration, and it seems that the patient was supposed to take lamotrigine 100 mg BID, then get lamotrigine level, and potentially increase lamotrigine further. Please confirm with the patient exact dose of lamotrigine she is taking, since I do not see lamotrigine level in the chart.    Once the dose is confirmed, I will send the script to [facility] in [place], [place], since I do not have [place] license. [facility] then can move the script to the pharmacy that is local for her.    Thank you.

## 2024-11-12 RX ORDER — LAMOTRIGINE 100 MG/1
TABLET ORAL
Qty: 180 TABLET | Refills: 0 | OUTPATIENT
Start: 2024-11-12

## 2025-04-15 ENCOUNTER — HOSPITAL ENCOUNTER (EMERGENCY)
Facility: CLINIC | Age: 30
Discharge: HOME | End: 2025-04-15
Attending: EMERGENCY MEDICINE | Admitting: EMERGENCY MEDICINE
Payer: MEDICAID

## 2025-04-15 VITALS
RESPIRATION RATE: 16 BRPM | HEART RATE: 81 BPM | DIASTOLIC BLOOD PRESSURE: 83 MMHG | TEMPERATURE: 98 F | WEIGHT: 107.6 LBS | OXYGEN SATURATION: 99 % | SYSTOLIC BLOOD PRESSURE: 118 MMHG

## 2025-04-15 DIAGNOSIS — K08.89 PAIN, DENTAL: ICD-10-CM

## 2025-04-15 PROCEDURE — 99283 EMERGENCY DEPT VISIT LOW MDM: CPT | Performed by: EMERGENCY MEDICINE

## 2025-04-15 PROCEDURE — 99284 EMERGENCY DEPT VISIT MOD MDM: CPT | Performed by: EMERGENCY MEDICINE

## 2025-04-15 RX ORDER — HYDROCODONE BITARTRATE AND ACETAMINOPHEN 5; 325 MG/1; MG/1
1 TABLET ORAL EVERY 6 HOURS PRN
Qty: 10 TABLET | Refills: 0 | Status: SHIPPED | OUTPATIENT
Start: 2025-04-15

## 2025-04-15 RX ORDER — HYDROCODONE BITARTRATE AND ACETAMINOPHEN 5; 325 MG/1; MG/1
1 TABLET ORAL EVERY 6 HOURS PRN
Qty: 10 TABLET | Refills: 0 | Status: SHIPPED | OUTPATIENT
Start: 2025-04-15 | End: 2025-04-15

## 2025-04-15 ASSESSMENT — COLUMBIA-SUICIDE SEVERITY RATING SCALE - C-SSRS
1. IN THE PAST MONTH, HAVE YOU WISHED YOU WERE DEAD OR WISHED YOU COULD GO TO SLEEP AND NOT WAKE UP?: NO
6. HAVE YOU EVER DONE ANYTHING, STARTED TO DO ANYTHING, OR PREPARED TO DO ANYTHING TO END YOUR LIFE?: NO
2. HAVE YOU ACTUALLY HAD ANY THOUGHTS OF KILLING YOURSELF IN THE PAST MONTH?: NO

## 2025-04-15 ASSESSMENT — ACTIVITIES OF DAILY LIVING (ADL): ADLS_ACUITY_SCORE: 42

## 2025-04-15 NOTE — ED PROVIDER NOTES
"  History     Chief Complaint   Patient presents with    Dental Pain     HPI  Isabela Hunt is a 29 year old female who presents to the emergency department secondary to left lower dental pain.  She has a history of impacted wisdom teeth and has been seen previously for similar on the right lower jaw.  She was given antibiotics and pain medicines which helped.  She has not seen a dentist for this given she does not have any dental insurance and is worried about the cost.  She has not looked into sliding fee scale dentist as of yet.  She has not had any jaw swelling or fever.  No difficulty swallowing.  No pustular discharge from the gums.  No bleeding.  Symptoms have progressed over the last couple of days.    Allergies:  Allergies   Allergen Reactions    Vancomycin Other (See Comments)       Problem List:    Patient Active Problem List    Diagnosis Date Noted    Suspected Serotonin syndrome - agitation, inducible clonus, hyperreflexia 10/29/2016     Priority: Medium    Delirium, drug-induced 10/29/2016     Priority: Medium    Sinus tachycardia 10/29/2016     Priority: Medium    Hyperglycemia - random glucose 272 10/29/2016     Priority: Medium    Chronic mental illness 10/29/2016     Priority: Medium    Ataxia 10/29/2016     Priority: Medium    Possible closed head injury - reported fall at home 10/29/2016     Priority: Medium    Scratches - multiple, ?self-induced 10/29/2016     Priority: Medium    Slurred speech with rapid rate 10/29/2016     Priority: Medium    Hypocalcemia 10/29/2016     Priority: Medium    Abdominal pain, generalized 04/03/2006     Priority: Medium        Past Medical History:    No past medical history on file.    Past Surgical History:    Past Surgical History:   Procedure Laterality Date    HC TOOTH EXTRACTION W/FORCEP      \"bottle teeth\", extracted as a child       Family History:    Family History   Problem Relation Age of Onset    Diabetes Father     Diabetes Paternal Grandfather  "    Heart Disease Father          at 45 from [place]       Social History:  Marital Status:  Single [1]  Social History     Tobacco Use    Smoking status: Never    Smokeless tobacco: Never    Tobacco comments:     Mom Quit smoking 2 years ago   Substance Use Topics    Alcohol use: No    Drug use: No        Medications:    amoxicillin-clavulanate (AUGMENTIN) 875-125 MG tablet  HYDROcodone-acetaminophen (NORCO) 5-325 MG tablet  nitrofurantoin, macrocrystal-monohydrate, (MACROBID) 100 MG capsule          Review of Systems   All other systems reviewed and are negative.      Physical Exam   BP: 118/83  Pulse: 81  Temp: 98  F (36.7  C)  Resp: 16  Weight: 48.8 kg (107 lb 9.6 oz)  SpO2: 99 %      Physical Exam  Vitals and nursing note reviewed.   Constitutional:       General: She is not in acute distress.     Appearance: Normal appearance. She is well-developed.   HENT:      Head: Normocephalic and atraumatic.      Right Ear: External ear normal.      Left Ear: External ear normal.      Nose: Nose normal.      Mouth/Throat:      Pharynx: No oropharyngeal exudate or posterior oropharyngeal erythema.      Comments: She has tenderness to palpation over the left lower jaw where the wisdom tooth has yet to erupt from the skin.  There is no fluctuance or erythema.  There is no swelling.  Eyes:      General: No scleral icterus.     Extraocular Movements: Extraocular movements intact.      Conjunctiva/sclera: Conjunctivae normal.   Cardiovascular:      Rate and Rhythm: Normal rate.   Pulmonary:      Effort: Pulmonary effort is normal. No respiratory distress.   Abdominal:      General: Abdomen is flat.   Musculoskeletal:         General: Normal range of motion.      Cervical back: Normal range of motion and neck supple.   Skin:     General: Skin is warm and dry.      Findings: No rash.   Neurological:      General: No focal deficit present.      Mental Status: She is alert and oriented to person, place, and time.         ED Course         Procedures             No results found for this or any previous visit (from the past 24 hours).    Medications - No data to display    Assessments & Plan (with Medical Decision Making)  Dental pain due to impacted wisdom tooth.  Patient was strongly advised to try to get follow-up for wisdom tooth extraction.  She was given a list of sliding fee scale dentist.  She was offered a dental block and declined.  Patient was given Augmentin and Norco for her pain.  She was advised not to drive if taking the Norco.  Patient was discharged home in stable condition.  Return to ER precautions and follow-up precautions discussed.  All questions answered prior to discharge.     I have reviewed the nursing notes.    I have reviewed the findings, diagnosis, plan and need for follow up with the patient.          Discharge Medication List as of 4/15/2025  7:14 PM        START taking these medications    Details   amoxicillin-clavulanate (AUGMENTIN) 875-125 MG tablet Take 1 tablet by mouth 2 times daily for 10 days., Disp-20 tablet, R-0, E-Prescribe      HYDROcodone-acetaminophen (NORCO) 5-325 MG tablet Take 1 tablet by mouth every 6 hours as needed for severe pain., Disp-10 tablet, R-0, E-Prescribe             Final diagnoses:   Pain, dental       4/15/2025   [facility] EMERGENCY DEPT       Bola Amor MD  04/15/25 2031

## 2025-04-16 ENCOUNTER — DOCUMENTATION ONLY (OUTPATIENT)
Dept: OTHER | Facility: CLINIC | Age: 30
End: 2025-04-16
Payer: MEDICAID

## 2025-04-16 NOTE — DISCHARGE INSTRUCTIONS
Return to the emergency department if you develop new or worsening symptoms.  Take the antibiotics as directed.  Use the pain medicines sparingly as they can make you sleepy.  Do not drive after taking them.  As discussed.  Follow-up with a dentist as soon as possible.  This most likely will continue to give you problems.  I hope you get better quickly.

## 2025-05-01 ENCOUNTER — HOSPITAL ENCOUNTER (EMERGENCY)
Facility: CLINIC | Age: 30
Discharge: HOME | End: 2025-05-01
Attending: PHYSICIAN ASSISTANT
Payer: COMMERCIAL

## 2025-05-01 VITALS
HEART RATE: 79 BPM | SYSTOLIC BLOOD PRESSURE: 132 MMHG | TEMPERATURE: 98.8 F | DIASTOLIC BLOOD PRESSURE: 81 MMHG | OXYGEN SATURATION: 99 % | RESPIRATION RATE: 16 BRPM

## 2025-05-01 DIAGNOSIS — G40.909 SEIZURE DISORDER (H): ICD-10-CM

## 2025-05-01 PROCEDURE — 99283 EMERGENCY DEPT VISIT LOW MDM: CPT | Performed by: PHYSICIAN ASSISTANT

## 2025-05-01 RX ORDER — LAMOTRIGINE 100 MG/1
100 TABLET ORAL 2 TIMES DAILY
Qty: 60 TABLET | Refills: 2 | Status: SHIPPED | OUTPATIENT
Start: 2025-05-01

## 2025-05-01 ASSESSMENT — COLUMBIA-SUICIDE SEVERITY RATING SCALE - C-SSRS
1. IN THE PAST MONTH, HAVE YOU WISHED YOU WERE DEAD OR WISHED YOU COULD GO TO SLEEP AND NOT WAKE UP?: NO
2. HAVE YOU ACTUALLY HAD ANY THOUGHTS OF KILLING YOURSELF IN THE PAST MONTH?: NO
6. HAVE YOU EVER DONE ANYTHING, STARTED TO DO ANYTHING, OR PREPARED TO DO ANYTHING TO END YOUR LIFE?: NO

## 2025-05-01 ASSESSMENT — ACTIVITIES OF DAILY LIVING (ADL): ADLS_ACUITY_SCORE: 42

## 2025-05-01 NOTE — ED TRIAGE NOTES
Pt presents for medication refill of lamotrigine 100 mg bid. Pt did have morning dose. Pt states moved from [place] 6 months ago and needs a neuro referral for refills.      Triage Assessment (Adult)       Row Name 05/01/25 2994          Triage Assessment    Airway WDL WDL        Respiratory WDL    Respiratory WDL WDL        Skin Circulation/Temperature WDL    Skin Circulation/Temperature WDL WDL        Cardiac WDL    Cardiac WDL WDL        Peripheral/Neurovascular WDL    Peripheral Neurovascular WDL WDL        Cognitive/Neuro/Behavioral WDL    Cognitive/Neuro/Behavioral WDL WDL

## 2025-05-02 NOTE — DISCHARGE INSTRUCTIONS
I sent refills for your seizure medicine to the pharmacy.  Please call the neurology office to line up a visit for recheck.  I placed a referral for you.

## 2025-05-02 NOTE — ED PROVIDER NOTES
"  History     Chief Complaint   Patient presents with    Medication Refill     HPI  Isabela Hunt is a 29 year old female who presents for evaluation hoping to get refill on her lamotrigine.  She has not had a seizure in 1.5 years.  She does well as long as she takes her medication.  She took her last pill this morning.  Takes 100 mg twice daily.  She thinks that he she saw neurology in September of last year, but she was unable to get refills.  She remembers being told that she needed to establish new neurology care prior to further for refills.  She has not had any complications from the medication.  Denies any side effects.  No recent fall or head injuries.  No fevers or chills.  She otherwise feels well.  Just needs refill.  She just moved back to [place], and needs to establish care.    Allergies:  Allergies   Allergen Reactions    Vancomycin Other (See Comments)       Problem List:    Patient Active Problem List    Diagnosis Date Noted    Suspected Serotonin syndrome - agitation, inducible clonus, hyperreflexia 10/29/2016     Priority: Medium    Delirium, drug-induced 10/29/2016     Priority: Medium    Sinus tachycardia 10/29/2016     Priority: Medium    Hyperglycemia - random glucose 272 10/29/2016     Priority: Medium    Chronic mental illness 10/29/2016     Priority: Medium    Ataxia 10/29/2016     Priority: Medium    Possible closed head injury - reported fall at home 10/29/2016     Priority: Medium    Scratches - multiple, ?self-induced 10/29/2016     Priority: Medium    Slurred speech with rapid rate 10/29/2016     Priority: Medium    Hypocalcemia 10/29/2016     Priority: Medium    Abdominal pain, generalized 04/03/2006     Priority: Medium        Past Medical History:    No past medical history on file.    Past Surgical History:    Past Surgical History:   Procedure Laterality Date    HC TOOTH EXTRACTION W/FORCEP      \"bottle teeth\", extracted as a child       Family History:    Family History "   Problem Relation Age of Onset    Diabetes Father     Diabetes Paternal Grandfather     Heart Disease Father          at 45 from [place]       Social History:  Marital Status:  Single [1]  Social History     Tobacco Use    Smoking status: Never    Smokeless tobacco: Never    Tobacco comments:     Mom Quit smoking 2 years ago   Substance Use Topics    Alcohol use: No    Drug use: No        Medications:    lamoTRIgine (LAMICTAL) 100 MG tablet  amoxicillin-clavulanate (AUGMENTIN) 875-125 MG tablet  HYDROcodone-acetaminophen (NORCO) 5-325 MG tablet  nitrofurantoin, macrocrystal-monohydrate, (MACROBID) 100 MG capsule          Review of Systems   All other systems reviewed and are negative.      Physical Exam   BP: 132/81  Pulse: 79  Temp: 98.8  F (37.1  C)  Resp: 16  SpO2: 99 %      Physical Exam  Vitals and nursing note reviewed.   Constitutional:       General: She is not in acute distress.     Appearance: She is not diaphoretic.   HENT:      Head: Normocephalic and atraumatic.      Right Ear: External ear normal.      Left Ear: External ear normal.      Nose: Nose normal.      Mouth/Throat:      Pharynx: No oropharyngeal exudate.   Eyes:      General: No scleral icterus.        Right eye: No discharge.         Left eye: No discharge.      Conjunctiva/sclera: Conjunctivae normal.      Pupils: Pupils are equal, round, and reactive to light.   Neck:      Thyroid: No thyromegaly.   Cardiovascular:      Rate and Rhythm: Normal rate and regular rhythm.      Heart sounds: Normal heart sounds. No murmur heard.  Pulmonary:      Effort: Pulmonary effort is normal. No respiratory distress.      Breath sounds: Normal breath sounds. No wheezing or rales.   Chest:      Chest wall: No tenderness.   Abdominal:      General: Bowel sounds are normal. There is no distension.      Palpations: Abdomen is soft. There is no mass.      Tenderness: There is no abdominal tenderness. There is no guarding or rebound.   Musculoskeletal:          General: No tenderness or deformity. Normal range of motion.      Cervical back: Normal range of motion and neck supple.   Lymphadenopathy:      Cervical: No cervical adenopathy.   Skin:     General: Skin is warm and dry.      Capillary Refill: Capillary refill takes less than 2 seconds.      Findings: No erythema or rash.   Neurological:      General: No focal deficit present.      Mental Status: She is alert and oriented to person, place, and time.      Cranial Nerves: No cranial nerve deficit.      Sensory: No sensory deficit.      Motor: No weakness.      Coordination: Coordination normal.      Gait: Gait normal.   Psychiatric:         Behavior: Behavior normal.         Thought Content: Thought content normal.         ED Course        Procedures              Critical Care time:  none     None         No results found for this or any previous visit (from the past 24 hours).    Medications - No data to display    Assessments & Plan (with Medical Decision Making)  Seizure disorder (H)     29 year old female with an established seizure disorder hoping for refill on her lamotrigine.  She ran out of the medication today.  Unable to get into see a neurologist in time.  Last neurology visit was roughly about 7 months ago per her report.  She just moved back to [place], and needs to establish care.  On exam she is vitally stable.  Neurologically intact.  Remainder of the exam is otherwise negative as noted above.  Patient is in her normal state of health.  She does not appear to have any complications from the medication.  It is working to stabilize her seizure disorder.  Therefore, I did provide refills to last until she can establish care with a new neurologist.  I also placed a referral with neurology.  She will call right away tomorrow to lined up a consultation with her soonest appointment.  ED return instructions reviewed with patient in detail.  She is in agreement this plan was suitable for discharge.     I  have reviewed the nursing notes.    I have reviewed the findings, diagnosis, plan and need for follow up with the patient.           Medical Decision Making  The patient's presentation was of low complexity (a stable chronic illness).    The patient's evaluation involved:  history and exam without other MDM data elements    The patient's management necessitated moderate risk (prescription drug management including medications given in the ED).        Discharge Medication List as of 5/1/2025  7:15 PM        START taking these medications    Details   lamoTRIgine (LAMICTAL) 100 MG tablet Take 1 tablet (100 mg) by mouth 2 times daily., Disp-60 tablet, R-2, E-Prescribe             Final diagnoses:   Seizure disorder (H)     Disclaimer: This note consists of symbols derived from keyboarding, dictation and/or voice recognition software. As a result, there may be errors in the script that have gone undetected. Please consider this when interpreting information found in this chart.      5/1/2025   [facility] EMERGENCY DEPT       Suresh Lay PA-C  05/01/25 5063

## 2025-06-09 ENCOUNTER — OFFICE VISIT (OUTPATIENT)
Dept: NEUROLOGY | Facility: CLINIC | Age: 30
End: 2025-06-09
Attending: PHYSICIAN ASSISTANT
Payer: COMMERCIAL

## 2025-06-09 VITALS
SYSTOLIC BLOOD PRESSURE: 111 MMHG | OXYGEN SATURATION: 100 % | DIASTOLIC BLOOD PRESSURE: 74 MMHG | WEIGHT: 107.2 LBS | HEART RATE: 79 BPM | TEMPERATURE: 98.2 F

## 2025-06-09 DIAGNOSIS — G40.909 SEIZURE DISORDER (H): ICD-10-CM

## 2025-06-09 RX ORDER — LAMOTRIGINE 25 MG/1
25 TABLET ORAL 2 TIMES DAILY
Qty: 180 TABLET | Refills: 3 | Status: SHIPPED | OUTPATIENT
Start: 2025-06-09

## 2025-06-09 RX ORDER — LAMOTRIGINE 100 MG/1
100 TABLET ORAL 2 TIMES DAILY
Qty: 180 TABLET | Refills: 3 | Status: SHIPPED | OUTPATIENT
Start: 2025-06-09

## 2025-06-09 NOTE — LETTER
"2025       RE: Isabela Hunt  : 1995   MRN: [id number]      Dear Colleague,    Thank you for referring your patient, Isabela Hunt, to the [facility] EPILEPSY CARE at [facility]. Please see a copy of my visit note below.    [facility] Epilepsy Care History and Physical       Patient:  Isabela Hunt  :  1995   Age:  29 year old   Today's Office Visit:  2025    Referring Provider:    Suresh Lay PA-C  [address] [place] [address]      History of Present Illness:  Isabela Hunt returned form [place].   Developed epilepsy age 28. Focal seizures out of sleep; jamais vu and intense fear. This occurred for 5 days in a row. Loss of consciousness and motor seizure once leading to hospitalization and diagnosis. EEG was abnormal. MRI was normal.   Diagnosed with left temporal epilepsy. Lamotrigine has been success; no major seizures since. No side effects. Prior LCM (anger), ZNS (sedation), CBZ (SJS; used in 2016 related to induced seizures from benadryl overdose)  She continues to have aura only, most days. Jamais vu. A bit \"dazed\" but does not lose awareness. Eating seems to trigger. May be more frequent on menstrual days.   History of seizures with drug use (benadryl); suicide attempt.   PMHx: fibromyalgia, ADHD. Disabled from these.  Epilepsy Risk Factors:  None  Precipitating factors:   NONE  No past medical history on file.   Past Surgical History:   Procedure Laterality Date     HC TOOTH EXTRACTION W/FORCEP      \"bottle teeth\", extracted as a child     Family History   Problem Relation Age of Onset     Diabetes Father      Diabetes Paternal Grandfather      Heart Disease Father          at 45 from [place]      Social History     Socioeconomic History     Marital status: Single   Tobacco Use     Smoking status: Never     Smokeless tobacco: Never     Tobacco comments:     Mom Quit smoking 2 " years ago   Substance and Sexual Activity     Alcohol use: No     Drug use: No     Sexual activity: Yes      Employment/School:  Unemployed, seeking work  Driving:  Currently patient is:  Not driving.  Previous Evaluations for Epilepsy:   EEG: apparently abnormal (left temporal)  MRI of Brain: normal (per report)    Current Outpatient Medications   Medication Sig Dispense Refill     amoxicillin-clavulanate (AUGMENTIN) 875-125 MG tablet Take 1 tablet by mouth 2 times daily. 20 tablet 0     HYDROcodone-acetaminophen (NORCO) 5-325 MG tablet Take 1 tablet by mouth every 6 hours as needed for severe pain. 10 tablet 0     lamoTRIgine (LAMICTAL) 100 MG tablet Take 1 tablet (100 mg) by mouth 2 times daily. 60 tablet 2     nitrofurantoin, macrocrystal-monohydrate, (MACROBID) 100 MG capsule Take 1 capsule (100 mg) by mouth 2 times daily 14 capsule 0       Perceived AED Side Effects: No    Past AEDs:      6/9/2025     2:50 PM   AED - ANTIEPILEPTIC DRUGS   lamoTRIgine 100 mg BID PO          Medication marked as long-term     LCM, ZNS, CBZ    Exam:    LMP 04/01/2025      Wt Readings from Last 5 Encounters:   04/15/25 107 lb 9.6 oz (48.8 kg)   10/29/16 134 lb 7.7 oz (61 kg)   04/20/13 98 lb 6.4 oz (44.6 kg) (2%, Z= -1.96)*   12/31/11 94 lb (42.6 kg) (1%, Z= -2.32)*   12/26/11 95 lb (43.1 kg) (1%, Z= -2.21)*     * Growth percentiles are based on CDC (Girls, 2-20 Years) data.     Unremarkable    Assessment and Plan:   Isabela Hunt has a history of epilepsy with semiology of jamais vu/fear. She has responded well to lamotrigine but continues to have breakthrough aura only. She in on a low dosage of lamotrigine. This will be increased to 125 BID.    She may follow up virtually in 6 months.    She does not have her DL but is safe to drive.      I spent 30 minutes on the date of the encounter doing chart review, history and exam, documentation and further activities as noted above.      Again, thank you for allowing me to  participate in the care of your patient.      Sincerely,    Israel Swenson MD

## 2025-06-09 NOTE — PROGRESS NOTES
"[facility] Epilepsy Care History and Physical       Patient:  Isabela Hunt  :  1995   Age:  29 year old   Today's Office Visit:  2025    Referring Provider:    Suresh Lay PA-C  [address] [place] [address]      History of Present Illness:  Isabela Hunt returned form [place].   Developed epilepsy age 28. Focal seizures out of sleep; jamais vu and intense fear. This occurred for 5 days in a row. Loss of consciousness and motor seizure once leading to hospitalization and diagnosis. EEG was abnormal. MRI was normal.   Diagnosed with left temporal epilepsy. Lamotrigine has been success; no major seizures since. No side effects. Prior LCM (anger), ZNS (sedation), CBZ (SJS; used in 2016 related to induced seizures from benadryl overdose)  She continues to have aura only, most days. Jamais vu. A bit \"dazed\" but does not lose awareness. Eating seems to trigger. May be more frequent on menstrual days.   History of seizures with drug use (benadryl); suicide attempt.   PMHx: fibromyalgia, ADHD. Disabled from these.  Epilepsy Risk Factors:  None  Precipitating factors:   NONE  No past medical history on file.   Past Surgical History:   Procedure Laterality Date    HC TOOTH EXTRACTION W/FORCEP      \"bottle teeth\", extracted as a child     Family History   Problem Relation Age of Onset    Diabetes Father     Diabetes Paternal Grandfather     Heart Disease Father          at 45 from [place]      Social History     Socioeconomic History    Marital status: Single   Tobacco Use    Smoking status: Never    Smokeless tobacco: Never    Tobacco comments:     Mom Quit smoking 2 years ago   Substance and Sexual Activity    Alcohol use: No    Drug use: No    Sexual activity: Yes      Employment/School:  Unemployed, seeking work  Driving:  Currently patient is:  Not driving.  Previous Evaluations for Epilepsy:   EEG: apparently abnormal (left temporal)  MRI of Brain: normal (per " report)    Current Outpatient Medications   Medication Sig Dispense Refill    amoxicillin-clavulanate (AUGMENTIN) 875-125 MG tablet Take 1 tablet by mouth 2 times daily. 20 tablet 0    HYDROcodone-acetaminophen (NORCO) 5-325 MG tablet Take 1 tablet by mouth every 6 hours as needed for severe pain. 10 tablet 0    lamoTRIgine (LAMICTAL) 100 MG tablet Take 1 tablet (100 mg) by mouth 2 times daily. 60 tablet 2    nitrofurantoin, macrocrystal-monohydrate, (MACROBID) 100 MG capsule Take 1 capsule (100 mg) by mouth 2 times daily 14 capsule 0       Perceived AED Side Effects: No    Past AEDs:      6/9/2025     2:50 PM   AED - ANTIEPILEPTIC DRUGS   lamoTRIgine 100 mg BID PO          Medication marked as long-term     LCM, ZNS, CBZ    Exam:    LMP 04/01/2025      Wt Readings from Last 5 Encounters:   04/15/25 107 lb 9.6 oz (48.8 kg)   10/29/16 134 lb 7.7 oz (61 kg)   04/20/13 98 lb 6.4 oz (44.6 kg) (2%, Z= -1.96)*   12/31/11 94 lb (42.6 kg) (1%, Z= -2.32)*   12/26/11 95 lb (43.1 kg) (1%, Z= -2.21)*     * Growth percentiles are based on [facility] (Girls, 2-20 Years) data.     Unremarkable    Assessment and Plan:   Isabela Hunt has a history of epilepsy with semiology of jamais vu/fear. She has responded well to lamotrigine but continues to have breakthrough aura only. She in on a low dosage of lamotrigine. This will be increased to 125 BID.    She may follow up virtually in 6 months.    She does not have her DL but is safe to drive.      I spent 30 minutes on the date of the encounter doing chart review, history and exam, documentation and further activities as noted above.

## 2025-06-23 DIAGNOSIS — G40.909 SEIZURE DISORDER (H): ICD-10-CM

## 2025-06-23 RX ORDER — LAMOTRIGINE 100 MG/1
100 TABLET ORAL 2 TIMES DAILY
Qty: 180 TABLET | Refills: 3 | OUTPATIENT
Start: 2025-06-23

## 2025-06-23 RX ORDER — LAMOTRIGINE 25 MG/1
25 TABLET ORAL 2 TIMES DAILY
Qty: 180 TABLET | Refills: 3 | OUTPATIENT
Start: 2025-06-23

## 2025-06-23 NOTE — TELEPHONE ENCOUNTER
Which pharmacy does pt go to?  [id number] - [place], [place] - [address] [place] [address]    Which medication is pt calling about?  lamoTRIgine (LAMICTAL) 25 MG tablet   lamoTRIgine (LAMICTAL) 100 MG tablet    How much medication does pt have left?  >5 days left    Does pt have refills?  NO    Does pt need the refill within 24 hours?  NO    Is medication a controlled substance?  NO    Is pt scheduled for provider follow-up visit?  YES, Appt Date: 12/15/25